# Patient Record
Sex: FEMALE | Race: BLACK OR AFRICAN AMERICAN | NOT HISPANIC OR LATINO | URBAN - METROPOLITAN AREA
[De-identification: names, ages, dates, MRNs, and addresses within clinical notes are randomized per-mention and may not be internally consistent; named-entity substitution may affect disease eponyms.]

---

## 2021-09-27 ENCOUNTER — OFFICE VISIT (OUTPATIENT)
Dept: URGENT CARE | Facility: CLINIC | Age: 21
End: 2021-09-27
Payer: COMMERCIAL

## 2021-09-27 VITALS
RESPIRATION RATE: 16 BRPM | HEART RATE: 64 BPM | DIASTOLIC BLOOD PRESSURE: 57 MMHG | TEMPERATURE: 98 F | WEIGHT: 178 LBS | SYSTOLIC BLOOD PRESSURE: 114 MMHG | BODY MASS INDEX: 34.95 KG/M2 | OXYGEN SATURATION: 99 % | HEIGHT: 60 IN

## 2021-09-27 DIAGNOSIS — Z11.3 SCREEN FOR STD (SEXUALLY TRANSMITTED DISEASE): Primary | ICD-10-CM

## 2021-09-27 LAB
B-HCG UR QL: NEGATIVE
BILIRUB UR QL STRIP: NEGATIVE
CTP QC/QA: YES
GLUCOSE UR QL STRIP: NEGATIVE
KETONES UR QL STRIP: NEGATIVE
LEUKOCYTE ESTERASE UR QL STRIP: NEGATIVE
PH, POC UA: 6 (ref 5–8)
POC BLOOD, URINE: NEGATIVE
POC NITRATES, URINE: NEGATIVE
PROT UR QL STRIP: NEGATIVE
SP GR UR STRIP: 1.02 (ref 1–1.03)
UROBILINOGEN UR STRIP-ACNC: NORMAL (ref 0.1–1.1)

## 2021-09-27 PROCEDURE — 3008F PR BODY MASS INDEX (BMI) DOCUMENTED: ICD-10-PCS | Mod: CPTII,S$GLB,, | Performed by: PHYSICIAN ASSISTANT

## 2021-09-27 PROCEDURE — 1159F MED LIST DOCD IN RCRD: CPT | Mod: CPTII,S$GLB,, | Performed by: PHYSICIAN ASSISTANT

## 2021-09-27 PROCEDURE — 81025 URINE PREGNANCY TEST: CPT | Mod: S$GLB,,, | Performed by: PHYSICIAN ASSISTANT

## 2021-09-27 PROCEDURE — 1160F RVW MEDS BY RX/DR IN RCRD: CPT | Mod: CPTII,S$GLB,, | Performed by: PHYSICIAN ASSISTANT

## 2021-09-27 PROCEDURE — 3008F BODY MASS INDEX DOCD: CPT | Mod: CPTII,S$GLB,, | Performed by: PHYSICIAN ASSISTANT

## 2021-09-27 PROCEDURE — 87481 CANDIDA DNA AMP PROBE: CPT | Mod: 59 | Performed by: PHYSICIAN ASSISTANT

## 2021-09-27 PROCEDURE — 86592 SYPHILIS TEST NON-TREP QUAL: CPT | Performed by: PHYSICIAN ASSISTANT

## 2021-09-27 PROCEDURE — 81025 POCT URINE PREGNANCY: ICD-10-PCS | Mod: S$GLB,,, | Performed by: PHYSICIAN ASSISTANT

## 2021-09-27 PROCEDURE — 3074F SYST BP LT 130 MM HG: CPT | Mod: CPTII,S$GLB,, | Performed by: PHYSICIAN ASSISTANT

## 2021-09-27 PROCEDURE — 1159F PR MEDICATION LIST DOCUMENTED IN MEDICAL RECORD: ICD-10-PCS | Mod: CPTII,S$GLB,, | Performed by: PHYSICIAN ASSISTANT

## 2021-09-27 PROCEDURE — 99214 OFFICE O/P EST MOD 30 MIN: CPT | Mod: 25,S$GLB,, | Performed by: PHYSICIAN ASSISTANT

## 2021-09-27 PROCEDURE — 1160F PR REVIEW ALL MEDS BY PRESCRIBER/CLIN PHARMACIST DOCUMENTED: ICD-10-PCS | Mod: CPTII,S$GLB,, | Performed by: PHYSICIAN ASSISTANT

## 2021-09-27 PROCEDURE — 3078F PR MOST RECENT DIASTOLIC BLOOD PRESSURE < 80 MM HG: ICD-10-PCS | Mod: CPTII,S$GLB,, | Performed by: PHYSICIAN ASSISTANT

## 2021-09-27 PROCEDURE — 87491 CHLMYD TRACH DNA AMP PROBE: CPT | Mod: 59 | Performed by: PHYSICIAN ASSISTANT

## 2021-09-27 PROCEDURE — 87389 HIV-1 AG W/HIV-1&-2 AB AG IA: CPT | Performed by: PHYSICIAN ASSISTANT

## 2021-09-27 PROCEDURE — 99214 PR OFFICE/OUTPT VISIT, EST, LEVL IV, 30-39 MIN: ICD-10-PCS | Mod: 25,S$GLB,, | Performed by: PHYSICIAN ASSISTANT

## 2021-09-27 PROCEDURE — 86803 HEPATITIS C AB TEST: CPT | Performed by: PHYSICIAN ASSISTANT

## 2021-09-27 PROCEDURE — 3074F PR MOST RECENT SYSTOLIC BLOOD PRESSURE < 130 MM HG: ICD-10-PCS | Mod: CPTII,S$GLB,, | Performed by: PHYSICIAN ASSISTANT

## 2021-09-27 PROCEDURE — 81003 POCT URINALYSIS, DIPSTICK, AUTOMATED, W/O SCOPE: ICD-10-PCS | Mod: QW,S$GLB,, | Performed by: PHYSICIAN ASSISTANT

## 2021-09-27 PROCEDURE — 81003 URINALYSIS AUTO W/O SCOPE: CPT | Mod: QW,S$GLB,, | Performed by: PHYSICIAN ASSISTANT

## 2021-09-27 PROCEDURE — 87591 N.GONORRHOEAE DNA AMP PROB: CPT | Mod: 59 | Performed by: PHYSICIAN ASSISTANT

## 2021-09-27 PROCEDURE — 3078F DIAST BP <80 MM HG: CPT | Mod: CPTII,S$GLB,, | Performed by: PHYSICIAN ASSISTANT

## 2021-09-27 RX ORDER — BUDESONIDE AND FORMOTEROL FUMARATE DIHYDRATE 160; 4.5 UG/1; UG/1
AEROSOL RESPIRATORY (INHALATION)
COMMUNITY
Start: 2021-08-06 | End: 2023-07-06

## 2021-09-29 LAB
C TRACH DNA SPEC QL NAA+PROBE: NOT DETECTED
HCV AB SERPL QL IA: NEGATIVE
HIV 1+2 AB+HIV1 P24 AG SERPL QL IA: NEGATIVE
N GONORRHOEA DNA SPEC QL NAA+PROBE: NOT DETECTED
RPR SER QL: NORMAL

## 2021-10-04 LAB
BACTERIAL VAGINOSIS DNA: POSITIVE
CANDIDA GLABRATA DNA: NEGATIVE
CANDIDA KRUSEI DNA: NEGATIVE
CANDIDA RRNA VAG QL PROBE: NEGATIVE
T VAGINALIS RRNA GENITAL QL PROBE: NEGATIVE

## 2021-10-05 ENCOUNTER — TELEPHONE (OUTPATIENT)
Dept: URGENT CARE | Facility: CLINIC | Age: 21
End: 2021-10-05

## 2021-10-05 DIAGNOSIS — B96.89 BV (BACTERIAL VAGINOSIS): Primary | ICD-10-CM

## 2021-10-05 DIAGNOSIS — N76.0 BV (BACTERIAL VAGINOSIS): Primary | ICD-10-CM

## 2021-10-05 RX ORDER — METRONIDAZOLE 500 MG/1
500 TABLET ORAL EVERY 12 HOURS
Qty: 14 TABLET | Refills: 0 | Status: SHIPPED | OUTPATIENT
Start: 2021-10-05 | End: 2021-10-12

## 2022-09-24 ENCOUNTER — HOSPITAL ENCOUNTER (EMERGENCY)
Facility: OTHER | Age: 22
Discharge: HOME | End: 2022-09-24
Attending: EMERGENCY MEDICINE
Payer: COMMERCIAL

## 2022-09-24 VITALS
BODY MASS INDEX: 39.27 KG/M2 | OXYGEN SATURATION: 100 % | RESPIRATION RATE: 22 BRPM | HEIGHT: 60 IN | DIASTOLIC BLOOD PRESSURE: 75 MMHG | WEIGHT: 200 LBS | SYSTOLIC BLOOD PRESSURE: 125 MMHG | HEART RATE: 60 BPM | TEMPERATURE: 98 F

## 2022-09-24 DIAGNOSIS — F41.9 ANXIETY: ICD-10-CM

## 2022-09-24 DIAGNOSIS — R11.2 NAUSEA AND VOMITING, INTRACTABILITY OF VOMITING NOT SPECIFIED, UNSPECIFIED VOMITING TYPE: Primary | ICD-10-CM

## 2022-09-24 DIAGNOSIS — R00.1 BRADYCARDIA: ICD-10-CM

## 2022-09-24 LAB
ALBUMIN SERPL BCP-MCNC: 3.6 G/DL (ref 3.5–5.2)
ALP SERPL-CCNC: 56 U/L (ref 55–135)
ALT SERPL W/O P-5'-P-CCNC: 30 U/L (ref 10–44)
ANION GAP SERPL CALC-SCNC: 10 MMOL/L (ref 8–16)
AST SERPL-CCNC: 19 U/L (ref 10–40)
B-HCG UR QL: NEGATIVE
BASOPHILS # BLD AUTO: 0.02 K/UL (ref 0–0.2)
BASOPHILS NFR BLD: 0.3 % (ref 0–1.9)
BILIRUB SERPL-MCNC: 0.3 MG/DL (ref 0.1–1)
BUN SERPL-MCNC: 9 MG/DL (ref 6–20)
CALCIUM SERPL-MCNC: 9.4 MG/DL (ref 8.7–10.5)
CHLORIDE SERPL-SCNC: 108 MMOL/L (ref 95–110)
CO2 SERPL-SCNC: 21 MMOL/L (ref 23–29)
CREAT SERPL-MCNC: 0.6 MG/DL (ref 0.5–1.4)
CTP QC/QA: YES
DIFFERENTIAL METHOD: ABNORMAL
EOSINOPHIL # BLD AUTO: 0.2 K/UL (ref 0–0.5)
EOSINOPHIL NFR BLD: 3.2 % (ref 0–8)
ERYTHROCYTE [DISTWIDTH] IN BLOOD BY AUTOMATED COUNT: 15.2 % (ref 11.5–14.5)
EST. GFR  (NO RACE VARIABLE): >60 ML/MIN/1.73 M^2
GLUCOSE SERPL-MCNC: 81 MG/DL (ref 70–110)
HCT VFR BLD AUTO: 35.9 % (ref 37–48.5)
HCV AB SERPL QL IA: NEGATIVE
HGB BLD-MCNC: 11.1 G/DL (ref 12–16)
HIV 1+2 AB+HIV1 P24 AG SERPL QL IA: NEGATIVE
IMM GRANULOCYTES # BLD AUTO: 0.01 K/UL (ref 0–0.04)
IMM GRANULOCYTES NFR BLD AUTO: 0.1 % (ref 0–0.5)
LYMPHOCYTES # BLD AUTO: 3.9 K/UL (ref 1–4.8)
LYMPHOCYTES NFR BLD: 54 % (ref 18–48)
MAGNESIUM SERPL-MCNC: 1.9 MG/DL (ref 1.6–2.6)
MCH RBC QN AUTO: 23.3 PG (ref 27–31)
MCHC RBC AUTO-ENTMCNC: 30.9 G/DL (ref 32–36)
MCV RBC AUTO: 75 FL (ref 82–98)
MONOCYTES # BLD AUTO: 0.9 K/UL (ref 0.3–1)
MONOCYTES NFR BLD: 11.9 % (ref 4–15)
NEUTROPHILS # BLD AUTO: 2.2 K/UL (ref 1.8–7.7)
NEUTROPHILS NFR BLD: 30.5 % (ref 38–73)
NRBC BLD-RTO: 0 /100 WBC
PLATELET # BLD AUTO: 326 K/UL (ref 150–450)
PMV BLD AUTO: 11.3 FL (ref 9.2–12.9)
POCT GLUCOSE: 88 MG/DL (ref 70–110)
POTASSIUM SERPL-SCNC: 4.3 MMOL/L (ref 3.5–5.1)
PROT SERPL-MCNC: 7.7 G/DL (ref 6–8.4)
RBC # BLD AUTO: 4.77 M/UL (ref 4–5.4)
SODIUM SERPL-SCNC: 139 MMOL/L (ref 136–145)
TSH SERPL DL<=0.005 MIU/L-ACNC: 2.69 UIU/ML (ref 0.4–4)
WBC # BLD AUTO: 7.2 K/UL (ref 3.9–12.7)

## 2022-09-24 PROCEDURE — 84443 ASSAY THYROID STIM HORMONE: CPT | Performed by: EMERGENCY MEDICINE

## 2022-09-24 PROCEDURE — 25000003 PHARM REV CODE 250: Performed by: EMERGENCY MEDICINE

## 2022-09-24 PROCEDURE — 93010 EKG 12-LEAD: ICD-10-PCS | Mod: ,,, | Performed by: INTERNAL MEDICINE

## 2022-09-24 PROCEDURE — 93010 ELECTROCARDIOGRAM REPORT: CPT | Mod: ,,, | Performed by: INTERNAL MEDICINE

## 2022-09-24 PROCEDURE — 81025 URINE PREGNANCY TEST: CPT | Performed by: EMERGENCY MEDICINE

## 2022-09-24 PROCEDURE — 87389 HIV-1 AG W/HIV-1&-2 AB AG IA: CPT | Performed by: EMERGENCY MEDICINE

## 2022-09-24 PROCEDURE — 85025 COMPLETE CBC W/AUTO DIFF WBC: CPT | Performed by: EMERGENCY MEDICINE

## 2022-09-24 PROCEDURE — 93005 ELECTROCARDIOGRAM TRACING: CPT

## 2022-09-24 PROCEDURE — 83735 ASSAY OF MAGNESIUM: CPT | Performed by: EMERGENCY MEDICINE

## 2022-09-24 PROCEDURE — 82962 GLUCOSE BLOOD TEST: CPT

## 2022-09-24 PROCEDURE — 99284 EMERGENCY DEPT VISIT MOD MDM: CPT | Mod: 25

## 2022-09-24 PROCEDURE — 80053 COMPREHEN METABOLIC PANEL: CPT | Performed by: EMERGENCY MEDICINE

## 2022-09-24 PROCEDURE — 86803 HEPATITIS C AB TEST: CPT | Performed by: EMERGENCY MEDICINE

## 2022-09-24 RX ORDER — ONDANSETRON 4 MG/1
4 TABLET, ORALLY DISINTEGRATING ORAL EVERY 6 HOURS PRN
Qty: 20 TABLET | Refills: 0 | Status: SHIPPED | OUTPATIENT
Start: 2022-09-24

## 2022-09-24 RX ORDER — NAPROXEN 500 MG/1
500 TABLET ORAL 2 TIMES DAILY WITH MEALS
Qty: 20 TABLET | Refills: 0 | Status: SHIPPED | OUTPATIENT
Start: 2022-09-24

## 2022-09-24 RX ORDER — NAPROXEN 250 MG/1
250 TABLET ORAL
Status: COMPLETED | OUTPATIENT
Start: 2022-09-24 | End: 2022-09-24

## 2022-09-24 RX ORDER — HYDROXYZINE PAMOATE 25 MG/1
25 CAPSULE ORAL EVERY 6 HOURS PRN
Qty: 25 CAPSULE | Refills: 0 | Status: SHIPPED | OUTPATIENT
Start: 2022-09-24

## 2022-09-24 RX ADMIN — NAPROXEN 250 MG: 250 TABLET ORAL at 06:09

## 2022-09-24 NOTE — ED PROVIDER NOTES
"Encounter Date: 2022    SCRIBE #1 NOTE: I, Charito Owens, am scribing for, and in the presence of,  Dave Curiel MD. I have scribed the following portions of the note - Other sections scribed: HPI, ROS, EKG.     History     Chief Complaint   Patient presents with    Nausea     Pt advised over the past 2-3 days she has been having sever nausea - pt denies vomiting, pt is unable to sleep due to the nausea - pt is also having llq pain/cramping     Dhara Cortes is a 22 y.o. female, with a PMHx of asthma, who presents to the ED for evaluation of 7/10 intensity nausea with vomiting ongoing for the last 4 days. She states she has difficulty with P/O intake and self hydration secondary to her nausea, stating "I think I was dehydrated because I wasn't leaving my room and I didn't pee for a whole day." She endorses associated symptoms of headache, chills, stating "the smell of cold air is what makes me nauseated," left upper and lower quadrant abdominal pain, and back pain. She reports noticing hematuria 4 days ago but subsequently got her period 2 days later, which she notes is 15 days early. She attributes her symptoms to increased anxiety and stress recently, "I think I just shut down." She states she has had increased familial stress with weekly panic attacks after her cousin  on 23. She denies vaginal discharge, dysuria, pain elsewhere, and other somatic complaints. She has NKDA. This is the extent of the patient's complaints at this time.    The history is provided by the patient.   Review of patient's allergies indicates:  No Known Allergies  Past Medical History:   Diagnosis Date    Asthma      History reviewed. No pertinent surgical history.  Family History   Problem Relation Age of Onset    No Known Problems Mother     No Known Problems Father      Social History     Tobacco Use    Smoking status: Never    Smokeless tobacco: Never   Substance Use Topics    Alcohol use: Not Currently    Drug use: Never "     Review of Systems   Constitutional:  Positive for chills. Negative for fever.   HENT:  Negative for rhinorrhea.    Respiratory:  Negative for cough, chest tightness, shortness of breath and wheezing.    Cardiovascular:  Negative for chest pain and leg swelling.   Gastrointestinal:  Positive for abdominal pain (left), nausea and vomiting. Negative for diarrhea.   Genitourinary:  Positive for hematuria.   Musculoskeletal:  Positive for back pain. Negative for myalgias.   Allergic/Immunologic: Negative for food allergies.   Neurological:  Positive for headaches. Negative for speech difficulty, weakness and light-headedness.   Psychiatric/Behavioral:  The patient is nervous/anxious.    All other systems reviewed and are negative.    Physical Exam     Initial Vitals [09/24/22 0200]   BP Pulse Resp Temp SpO2   (!) 172/81 77 16 98.5 °F (36.9 °C) 99 %      MAP       --         Physical Exam    Nursing note and vitals reviewed.  Constitutional: She appears well-developed and well-nourished. She is not diaphoretic. No distress.   HENT:   Head: Normocephalic and atraumatic.   Right Ear: External ear normal.   Left Ear: External ear normal.   Eyes: Conjunctivae and EOM are normal. Pupils are equal, round, and reactive to light.   Neck: Neck supple. No tracheal deviation present. No JVD present.   Normal range of motion.  Cardiovascular:  Normal rate, regular rhythm, normal heart sounds and intact distal pulses.     Exam reveals no gallop and no friction rub.       No murmur heard.  Pulmonary/Chest: Breath sounds normal. No respiratory distress. She has no wheezes. She has no rhonchi. She has no rales. She exhibits no tenderness.   Abdominal: Abdomen is soft. Bowel sounds are normal. She exhibits no distension and no mass. There is no abdominal tenderness. There is no rebound and no guarding.   Musculoskeletal:         General: No tenderness or edema. Normal range of motion.      Cervical back: Normal range of motion and  neck supple.     Neurological: She is alert and oriented to person, place, and time. She has normal strength. She displays normal reflexes. No cranial nerve deficit or sensory deficit. GCS score is 15. GCS eye subscore is 4. GCS verbal subscore is 5. GCS motor subscore is 6.   Skin: Skin is warm and dry. Capillary refill takes less than 2 seconds. No rash noted. No erythema.   Psychiatric:   Anxious, cooperative       ED Course   Procedures  Labs Reviewed   CBC W/ AUTO DIFFERENTIAL - Abnormal; Notable for the following components:       Result Value    Hemoglobin 11.1 (*)     Hematocrit 35.9 (*)     MCV 75 (*)     MCH 23.3 (*)     MCHC 30.9 (*)     RDW 15.2 (*)     Gran % 30.5 (*)     Lymph % 54.0 (*)     All other components within normal limits   COMPREHENSIVE METABOLIC PANEL - Abnormal; Notable for the following components:    CO2 21 (*)     All other components within normal limits   HIV 1 / 2 ANTIBODY    Narrative:     Release to patient->Immediate   HEPATITIS C ANTIBODY    Narrative:     Release to patient->Immediate   MAGNESIUM   TSH   POCT URINE PREGNANCY   POCT GLUCOSE   POCT GLUCOSE MONITORING CONTINUOUS     EKG Readings: (Independently Interpreted)   Sinus bradycardia. Rate of 56 bpm. No STEMI. Normal Q.T interval.       Imaging Results    None          Medications   naproxen tablet 250 mg (has no administration in time range)     Medical Decision Making:   History:   Old Medical Records: I decided to obtain old medical records.  Differential Diagnosis:   Anxiety, panic attack, thyroid disorder, electrolyte abnormality, early pregnancy, hypoglycemia, gastroesophageal reflux disease, peptic ulcer disease  Independently Interpreted Test(s):   I have ordered and independently interpreted EKG Reading(s) - see prior notes  Clinical Tests:   Lab Tests: Ordered and Reviewed  Medical Tests: Ordered and Reviewed  ED Management:  Discussed with patient that there was no significant findings on today's evaluation,  but I did recommend that she find a PCP locally to help her continue to try and help figure out the cause of her symptoms.  Did point out to the patient that I feel is a strong anxiety component related to her symptoms, so will provide her with medications including nausea medicine until she can follow-up.  Patient discharged home in stable condition. Diagnosis and treatment plan explained to patient. No further workup indicated based on their complaints or examination today. Discussed results with the patient. I educated the patient/guardian on the warning signs and symptoms for which they must seek immediate medical attention. All questions addressed and patient/guardian were given discharge instructions and followup information.         Scribe Attestation:   Scribe #1: I performed the above scribed service and the documentation accurately describes the services I performed. I attest to the accuracy of the note.    I, IKE, personally performed the services described in this documentation. All medical record entries made by the scribe were at my direction and in my presence. I have reviewed the chart and agree that the record reflects my personal performance and is accurate and complete.                   Clinical Impression:   Final diagnoses:  [R00.1] Bradycardia  [R11.2] Nausea and vomiting, intractability of vomiting not specified, unspecified vomiting type (Primary)  [F41.9] Anxiety      ED Disposition Condition    Discharge Stable          ED Prescriptions       Medication Sig Dispense Start Date End Date Auth. Provider    ondansetron (ZOFRAN-ODT) 4 MG TbDL Take 1 tablet (4 mg total) by mouth every 6 (six) hours as needed (Nausea and vomiting). 20 tablet 9/24/2022 -- Dave Curiel MD    naproxen (NAPROSYN) 500 MG tablet Take 1 tablet (500 mg total) by mouth 2 (two) times daily with meals. For pain 20 tablet 9/24/2022 -- Dave Curiel MD    hydrOXYzine pamoate (VISTARIL) 25 MG Cap Take 1 capsule (25 mg  total) by mouth every 6 (six) hours as needed (Anxiety). 25 capsule 9/24/2022 -- Dave Curiel MD          Follow-up Information       Follow up With Specialties Details Why Contact Info Additional Information    [facility] - Internal Medicine Internal Medicine Schedule an appointment as soon as possible for a visit  For follow-up and re-evaluation [address] [facility] [id number]  [phone] Internal Medicine - [facility], 8th Floor, [address] Please park in [place] and use [place] elevators    [facility] - Emergency Dept Emergency Medicine  As needed, for any new or worsening symptoms [address] [facility] [id number] [phone]              Dave Curiel MD  09/24/22 0549

## 2022-09-24 NOTE — ED TRIAGE NOTES
"Pt c/o nausea w/ vomiting for approx 1 week, states she "went without eating/drinking for about 2 days and felt she was dehydrated." When she started eating again the nausea, started. Also c/o LUQ abd pain. Started menses 3 days ago.    LOC: The patient is awake, alert, and oriented to self, place, time, and situation. Pt is calm and cooperative. Affect is appropriate.  Speech is appropriate and clear.      APPEARANCE: Patient resting comfortably in no acute distress.  Patient is clean and well groomed.     SKIN: The skin is warm and dry; color consistent with ethnicity, intact; no breakdown or bruising noted.      MUSCULOSKELETAL:  MAEAW; denies pain or weakness.      RESPIRATORY: Airway is open and patent. Respirations are non-labored with normal effort and rate.  Denies cough.      CARDIAC:  Normal rate noted, normotensive.  No peripheral edema noted. No complaints of chest pain at this time.      ABDOMEN: Pt admits abdominal pain, nausea, vomiting, and intermittent diarrhea.     NEUROLOGIC: Follows commands;  Pt denies headache, lightheadedness or dizziness; denies loss of consciousness.       "

## 2023-07-06 ENCOUNTER — OFFICE VISIT (OUTPATIENT)
Dept: OTOLARYNGOLOGY | Facility: CLINIC | Age: 23
End: 2023-07-06
Payer: COMMERCIAL

## 2023-07-06 VITALS — WEIGHT: 222.25 LBS | BODY MASS INDEX: 43.4 KG/M2

## 2023-07-06 DIAGNOSIS — J45.20 INTERMITTENT ASTHMA WITHOUT COMPLICATION, UNSPECIFIED ASTHMA SEVERITY: ICD-10-CM

## 2023-07-06 DIAGNOSIS — J34.3 HYPERTROPHY OF INFERIOR NASAL TURBINATE: ICD-10-CM

## 2023-07-06 DIAGNOSIS — J01.90 ACUTE NON-RECURRENT SINUSITIS, UNSPECIFIED LOCATION: ICD-10-CM

## 2023-07-06 DIAGNOSIS — J30.89 NON-SEASONAL ALLERGIC RHINITIS DUE TO OTHER ALLERGIC TRIGGER: ICD-10-CM

## 2023-07-06 DIAGNOSIS — R07.0 BURNING SENSATION OF THROAT: ICD-10-CM

## 2023-07-06 DIAGNOSIS — J06.9 VIRAL URI: Primary | ICD-10-CM

## 2023-07-06 DIAGNOSIS — R04.1 HEMORRHAGE OF VOCAL CORD: ICD-10-CM

## 2023-07-06 PROBLEM — Z91.09 HOUSE DUST MITE ALLERGY: Status: ACTIVE | Noted: 2023-07-06

## 2023-07-06 PROCEDURE — 99999 PR PBB SHADOW E&M-EST. PATIENT-LVL III: ICD-10-PCS | Mod: PBBFAC,,, | Performed by: PHYSICIAN ASSISTANT

## 2023-07-06 PROCEDURE — 31575 DIAGNOSTIC LARYNGOSCOPY: CPT | Mod: S$GLB,,, | Performed by: PHYSICIAN ASSISTANT

## 2023-07-06 PROCEDURE — 3008F BODY MASS INDEX DOCD: CPT | Mod: CPTII,S$GLB,, | Performed by: PHYSICIAN ASSISTANT

## 2023-07-06 PROCEDURE — 99999 PR PBB SHADOW E&M-EST. PATIENT-LVL III: CPT | Mod: PBBFAC,,, | Performed by: PHYSICIAN ASSISTANT

## 2023-07-06 PROCEDURE — 31575 LARYNGOSCOPY: ICD-10-PCS | Mod: S$GLB,,, | Performed by: PHYSICIAN ASSISTANT

## 2023-07-06 PROCEDURE — 1159F PR MEDICATION LIST DOCUMENTED IN MEDICAL RECORD: ICD-10-PCS | Mod: CPTII,S$GLB,, | Performed by: PHYSICIAN ASSISTANT

## 2023-07-06 PROCEDURE — 1159F MED LIST DOCD IN RCRD: CPT | Mod: CPTII,S$GLB,, | Performed by: PHYSICIAN ASSISTANT

## 2023-07-06 PROCEDURE — 99204 OFFICE O/P NEW MOD 45 MIN: CPT | Mod: 25,S$GLB,, | Performed by: PHYSICIAN ASSISTANT

## 2023-07-06 PROCEDURE — 99204 PR OFFICE/OUTPT VISIT, NEW, LEVL IV, 45-59 MIN: ICD-10-PCS | Mod: 25,S$GLB,, | Performed by: PHYSICIAN ASSISTANT

## 2023-07-06 PROCEDURE — 3008F PR BODY MASS INDEX (BMI) DOCUMENTED: ICD-10-PCS | Mod: CPTII,S$GLB,, | Performed by: PHYSICIAN ASSISTANT

## 2023-07-06 RX ORDER — ALBUTEROL SULFATE 90 UG/1
2 AEROSOL, METERED RESPIRATORY (INHALATION) EVERY 6 HOURS PRN
Qty: 8 G | Refills: 0 | Status: SHIPPED | OUTPATIENT
Start: 2023-07-06

## 2023-07-06 RX ORDER — AZELASTINE 1 MG/ML
1 SPRAY, METERED NASAL 2 TIMES DAILY
Qty: 15 ML | Refills: 2 | Status: SHIPPED | OUTPATIENT
Start: 2023-07-06 | End: 2023-12-17

## 2023-07-06 RX ORDER — BUDESONIDE AND FORMOTEROL FUMARATE DIHYDRATE 160; 4.5 UG/1; UG/1
2 AEROSOL RESPIRATORY (INHALATION) EVERY 12 HOURS
Qty: 10.2 G | Refills: 0 | Status: SHIPPED | OUTPATIENT
Start: 2023-07-06 | End: 2024-07-05

## 2023-07-06 RX ORDER — AMOXICILLIN 875 MG/1
875 TABLET, FILM COATED ORAL EVERY 12 HOURS
Qty: 20 TABLET | Refills: 0 | Status: SHIPPED | OUTPATIENT
Start: 2023-07-06 | End: 2023-07-16

## 2023-07-06 NOTE — PATIENT INSTRUCTIONS
Astelin (Azelastine)  This is a nasal spray that primarily treats nasal drainage/runny nose (rhinorrhea) and nasal itching.    This works fairly quickly after onset and can be used as needed (does not have to be used as a scheduled medication).  Use as directed, up to 2 times daily.    Helpful hints for maximizing nasal spray medication into the nose  - Use the opposite hand to spray the nostril (example: right hand for left nostril). This will help avoid spraying the medication onto the septum (the area that divides the left and right nasal cavity.)  - Tilt the bottle so that it is facing at a slight angle up or straight back, but avoid pointing the bottle straight up while spraying.   - Gently sniff (do not snort) a few seconds after spraying.

## 2023-07-06 NOTE — PROGRESS NOTES
Subjective:     HPI: Dhara Cortes is a 23 y.o. female who was self-referred for  throat burning .    Patient reports developing sore throat 5 days ago with associated fevers (101), chills, neck pain, and odynophagia. She took cold medicine and next day bilateral ears were burning.  Patient reports persistent odynophagia including water and food causing a burning sensation in her throat.  Patient was told that she had white spots on her tonsils and was advised to go to the ED. Patient was seen at a clinic and had negative strep and COVID testing.  Patient was started on azithromycin and prednisone but patient was unable to tolerate azithromycin GI side effects.  She is been taking ibuprofen which has seemed to help with the ear burning.  Patient denies any muffled hearing.    Current sinonasal medications include none at present.  The last course of antibiotics was recently.    She does not recall previously having allergy testing.  She relates a history of asthma which is currently managed with symbicort and albuterol.  She denies a history of reflux symptoms.    She denies a diagnosis of obstructive sleep apnea.   She does not recall a prior history of nasal trauma.  She has not had sinonasal surgery.    She has not had a tonsillectomy.  She is not a tobacco smoker.     Past Medical/Past Surgical History  Past Medical History:   Diagnosis Date    Asthma      She has no past surgical history on file.    Family History/Social History  Her family history includes No Known Problems in her father and mother.  She reports that she has never smoked. She has never used smokeless tobacco. She reports that she does not currently use alcohol. She reports that she does not use drugs.    Allergies/Immunizations  She has No Known Allergies.    There is no immunization history on file for this patient.     Medications   budesonide-formoterol 160-4.5 mcg Hfaa  hydrOXYzine pamoate Cap  naproxen  ondansetron Tbdl     Review of  Systems     Constitutional: Positive for chills and fever.      HENT: Positive for sinus pressure, sore throat, stuffy nose, tonsil infection, trouble swallowing and voice change.  Negative for ear discharge, ear pain, hearing loss, nosebleeds, postnasal drip and runny nose.      Respiratory:  Positive for cough. Negative for snoring.      Allergy: Negative for seasonal allergies.     Neurological: Negative for dizziness and headaches.      Hematologic: Negative for swollen glands.      Psychiatric: Negative for sleep disturbance.          Objective:     Wt 100.8 kg (222 lb 3.6 oz)   BMI 43.40 kg/m²        Constitutional:   She appears well-developed and well-nourished. Normal speech.      Head:  Normocephalic and atraumatic. Facial strength is normal.      Ears:    Right Ear: No drainage or swelling. Tympanic membrane is not perforated and not erythematous. No middle ear effusion.   Left Ear: No drainage or swelling. Tympanic membrane is not perforated and not erythematous.  No middle ear effusion.     Nose:  No mucosal edema, rhinorrhea, septal deviation or polyps.  No foreign bodies. Turbinate hypertrophy (cyanotic).  Turbinates normal and no turbinate masses.  Right sinus exhibits no maxillary sinus tenderness and no frontal sinus tenderness. Left sinus exhibits no maxillary sinus tenderness and no frontal sinus tenderness.   Possible coagulated blood near R MT    Mouth/Throat  Oropharynx clear and moist without lesions or asymmetry, normal uvula midline and lips, teeth, and gums normal. No trismus or mucous membrane lesions. No oropharyngeal exudate, posterior oropharyngeal edema, posterior oropharyngeal erythema or tonsillar abscesses. Tonsils present, +3, tonsillar erythema.  Tonsillar exudate.    Ulceration on L tonsil; no exudates      Neck:  Neck normal without thyromegaly masses, asymmetry, normal tracheal structure, crepitus, and tenderness and phonation normal.     She has no cervical adenopathy.   TTP  to anterior, paratracheal neck region     Pulmonary/Chest:   Effort normal.     Psychiatric:   She has a normal mood and affect. Her speech is normal and behavior is normal.     Procedure    Flexible laryngoscopy performed.  See procedure note.     L NV     L MT     L ET with white purulence on nasopharyngeal wall      R NV      R MT with clear mucous    Hypopharynx/larynx with postcricoid edema and suspected L VF hemorrhage     VF mobility      Data Reviewed  I personally reviewed the chart, including any outside records, and pertinent data below:    WBC (K/uL)   Date Value   09/24/2022 7.20     Eosinophil % (%)   Date Value   09/24/2022 3.2     Eos # (K/uL)   Date Value   09/24/2022 0.2     Platelets (K/uL)   Date Value   09/24/2022 326     Glucose (mg/dL)   Date Value   09/24/2022 81     No results found for: IGE    No sinus imaging available.    Assessment & Plan:     1. Viral URI  2. Burning sensation of throat  -     Laryngoscopy with evidence of acute sinus infection and LPR  - No evidence of active bacterial tonsillitis, but healing ulcer on L tonsil without circumferential erythema and erythematous tonsils  - Suspect viral infection causing burning sensation to bilateral ears and throat, but could be atypical presentation for LPR  - Advised salt water gargles  - Should improve in 1-2 weeks  - (Magic mouthwash) 1:1:1 diphenhydrAMINE(Benadryl) 12.5mg/5ml liq, aluminum & magnesium hydroxide-simethicone (Maalox), LIDOcaine viscous 2%; Swish and spit 10 mLs every 4 (four) hours as needed (throat burning). for mouth sores  Dispense: 180 mL; Refill: 0    3. Acute non-recurrent sinusitis, unspecified location  -     evidence of residual sinus infection on exam; patient did not finish Zpak d/t GI side effects  - amoxicillin (AMOXIL) 875 MG tablet; Take 1 tablet (875 mg total) by mouth every 12 (twelve) hours. for 10 days  Dispense: 20 tablet; Refill: 0    4. Non-seasonal allergic rhinitis due to other allergic  trigger  5. Hypertrophy of inferior nasal turbinate  -     azelastine (ASTELIN) 137 mcg (0.1 %) nasal spray; 1 spray (137 mcg total) by Nasal route 2 (two) times daily.  Dispense: 15 mL; Refill: 2    6. Hemorrhage of vocal cord   - acute cough today likely contributing   - will re-assess voice at next visit  7. Intermittent asthma without complication, unspecified asthma severity  -     budesonide-formoterol 160-4.5 mcg (SYMBICORT) 160-4.5 mcg/actuation HFAA; Inhale 2 puffs into the lungs every 12 (twelve) hours. Controller  Dispense: 10.2 g; Refill: 0  -     albuterol (VENTOLIN HFA) 90 mcg/actuation inhaler; Inhale 2 puffs into the lungs every 6 (six) hours as needed for Wheezing. Rescue  Dispense: 8 g; Refill: 0    She will Follow up in about 3 weeks (around 7/27/2023) for re-evaluate post treatment.  I had a discussion with the patient regarding her condition and the further workup and management options.    All questions were answered, and the patient is in agreement with the above.     Disclaimer:  This note may have been prepared utilizing voice recognition software which may result in occasional typographical errors in the text such as sound alike words.   If further clarification is needed, please contact the ENT department of [facility].   (0) independent

## 2023-07-06 NOTE — PROCEDURES
"Laryngoscopy    Date/Time: 7/6/2023 2:30 PM  Performed by: Raul Harrison PA-C  Authorized by: Raul Harrison PA-C     Time out: Immediately prior to procedure a "time out" was called to verify the correct patient, procedure, equipment, support staff and site/side marked as required.    Consent Done?:  Yes (Verbal)  Anesthesia:     Local anesthetic:  Lidocaine 4% and Srikanth-Synephrine 1/2%    Patient tolerance:  Patient tolerated the procedure well with no immediate complications  Laryngoscopy:     Areas examined:  Nasal cavities, nasopharynx, oropharynx, hypopharynx, larynx and vocal cords    Laryngoscope size:  4 mm  Nose External:      No external nasal deformity  Nose Intranasal:      Mucosa no polyps     Mucosa ulcers not present     No mucosa lesions present     No septum gross deformity     Enlarged turbinates  Nasopharynx:      No mucosa lesions     Adenoids present     Posterior choanae patent     Eustachian tube patent  Larynx/hypopharynx:      No epiglottis lesions     No epiglottis edema     No AE folds lesions     No vocal cord polyps     Equal and normal bilateral     No hypopharynx lesions     No piriform sinus pooling     No piriform sinus lesions     Post cricoid edema     No post cricoid erythema     Erythema to VF  L VF with suspected VF hemorrhage  Whitish/yellow mucopurulence in nasopharynx  "

## 2023-10-26 ENCOUNTER — HOSPITAL ENCOUNTER (EMERGENCY)
Facility: OTHER | Age: 23
Discharge: HOME | End: 2023-10-26
Attending: EMERGENCY MEDICINE
Payer: COMMERCIAL

## 2023-10-26 VITALS
DIASTOLIC BLOOD PRESSURE: 78 MMHG | TEMPERATURE: 99 F | HEIGHT: 60 IN | BODY MASS INDEX: 38.28 KG/M2 | RESPIRATION RATE: 16 BRPM | OXYGEN SATURATION: 98 % | HEART RATE: 81 BPM | WEIGHT: 195 LBS | SYSTOLIC BLOOD PRESSURE: 140 MMHG

## 2023-10-26 DIAGNOSIS — R05.9 COUGH: Primary | ICD-10-CM

## 2023-10-26 DIAGNOSIS — R07.89 CHEST WALL PAIN: ICD-10-CM

## 2023-10-26 LAB
ALBUMIN SERPL BCP-MCNC: 3.9 G/DL (ref 3.5–5.2)
ALP SERPL-CCNC: 63 U/L (ref 55–135)
ALT SERPL W/O P-5'-P-CCNC: 18 U/L (ref 10–44)
ANION GAP SERPL CALC-SCNC: 10 MMOL/L (ref 8–16)
AST SERPL-CCNC: 31 U/L (ref 10–40)
BASOPHILS # BLD AUTO: 0.02 K/UL (ref 0–0.2)
BASOPHILS NFR BLD: 0.3 % (ref 0–1.9)
BILIRUB SERPL-MCNC: 0.3 MG/DL (ref 0.1–1)
BNP SERPL-MCNC: <10 PG/ML (ref 0–99)
BUN SERPL-MCNC: 13 MG/DL (ref 6–20)
CALCIUM SERPL-MCNC: 9 MG/DL (ref 8.7–10.5)
CHLORIDE SERPL-SCNC: 107 MMOL/L (ref 95–110)
CO2 SERPL-SCNC: 19 MMOL/L (ref 23–29)
CREAT SERPL-MCNC: 0.7 MG/DL (ref 0.5–1.4)
DIFFERENTIAL METHOD: ABNORMAL
EOSINOPHIL # BLD AUTO: 0.4 K/UL (ref 0–0.5)
EOSINOPHIL NFR BLD: 6.2 % (ref 0–8)
ERYTHROCYTE [DISTWIDTH] IN BLOOD BY AUTOMATED COUNT: 15.3 % (ref 11.5–14.5)
EST. GFR  (NO RACE VARIABLE): >60 ML/MIN/1.73 M^2
GLUCOSE SERPL-MCNC: 88 MG/DL (ref 70–110)
HCT VFR BLD AUTO: 35.2 % (ref 37–48.5)
HGB BLD-MCNC: 11 G/DL (ref 12–16)
IMM GRANULOCYTES # BLD AUTO: 0.02 K/UL (ref 0–0.04)
IMM GRANULOCYTES NFR BLD AUTO: 0.3 % (ref 0–0.5)
LYMPHOCYTES # BLD AUTO: 2.3 K/UL (ref 1–4.8)
LYMPHOCYTES NFR BLD: 36.4 % (ref 18–48)
MCH RBC QN AUTO: 22.9 PG (ref 27–31)
MCHC RBC AUTO-ENTMCNC: 31.3 G/DL (ref 32–36)
MCV RBC AUTO: 73 FL (ref 82–98)
MONOCYTES # BLD AUTO: 0.9 K/UL (ref 0.3–1)
MONOCYTES NFR BLD: 13.6 % (ref 4–15)
NEUTROPHILS # BLD AUTO: 2.7 K/UL (ref 1.8–7.7)
NEUTROPHILS NFR BLD: 43.2 % (ref 38–73)
NRBC BLD-RTO: 0 /100 WBC
PLATELET # BLD AUTO: 288 K/UL (ref 150–450)
PMV BLD AUTO: 10.9 FL (ref 9.2–12.9)
POTASSIUM SERPL-SCNC: 4.1 MMOL/L (ref 3.5–5.1)
PROT SERPL-MCNC: 8 G/DL (ref 6–8.4)
RBC # BLD AUTO: 4.81 M/UL (ref 4–5.4)
SODIUM SERPL-SCNC: 136 MMOL/L (ref 136–145)
TROPONIN I SERPL DL<=0.01 NG/ML-MCNC: <0.006 NG/ML (ref 0–0.03)
WBC # BLD AUTO: 6.26 K/UL (ref 3.9–12.7)

## 2023-10-26 PROCEDURE — 94640 AIRWAY INHALATION TREATMENT: CPT

## 2023-10-26 PROCEDURE — 25000003 PHARM REV CODE 250: Performed by: EMERGENCY MEDICINE

## 2023-10-26 PROCEDURE — 80053 COMPREHEN METABOLIC PANEL: CPT | Performed by: EMERGENCY MEDICINE

## 2023-10-26 PROCEDURE — 85025 COMPLETE CBC W/AUTO DIFF WBC: CPT | Performed by: EMERGENCY MEDICINE

## 2023-10-26 PROCEDURE — 99284 EMERGENCY DEPT VISIT MOD MDM: CPT | Mod: 25

## 2023-10-26 PROCEDURE — 83880 ASSAY OF NATRIURETIC PEPTIDE: CPT | Performed by: EMERGENCY MEDICINE

## 2023-10-26 PROCEDURE — 25000242 PHARM REV CODE 250 ALT 637 W/ HCPCS: Performed by: EMERGENCY MEDICINE

## 2023-10-26 PROCEDURE — 84484 ASSAY OF TROPONIN QUANT: CPT | Performed by: EMERGENCY MEDICINE

## 2023-10-26 RX ORDER — BENZONATATE 100 MG/1
100 CAPSULE ORAL 3 TIMES DAILY PRN
Qty: 20 CAPSULE | Refills: 0 | Status: SHIPPED | OUTPATIENT
Start: 2023-10-26 | End: 2023-11-05

## 2023-10-26 RX ORDER — BENZONATATE 100 MG/1
100 CAPSULE ORAL 3 TIMES DAILY PRN
Status: DISCONTINUED | OUTPATIENT
Start: 2023-10-26 | End: 2023-10-26 | Stop reason: HOSPADM

## 2023-10-26 RX ORDER — FLUTICASONE PROPIONATE 50 MCG
1 SPRAY, SUSPENSION (ML) NASAL 2 TIMES DAILY PRN
Qty: 15 G | Refills: 0 | Status: SHIPPED | OUTPATIENT
Start: 2023-10-26

## 2023-10-26 RX ORDER — ALBUTEROL SULFATE 90 UG/1
1-2 AEROSOL, METERED RESPIRATORY (INHALATION) EVERY 4 HOURS PRN
Qty: 8 G | Refills: 1 | Status: SHIPPED | OUTPATIENT
Start: 2023-10-26 | End: 2024-10-25

## 2023-10-26 RX ORDER — IBUPROFEN 600 MG/1
600 TABLET ORAL EVERY 6 HOURS PRN
Qty: 20 TABLET | Refills: 0 | Status: SHIPPED | OUTPATIENT
Start: 2023-10-26

## 2023-10-26 RX ORDER — ALBUTEROL SULFATE 2.5 MG/.5ML
2.5 SOLUTION RESPIRATORY (INHALATION)
Status: COMPLETED | OUTPATIENT
Start: 2023-10-26 | End: 2023-10-26

## 2023-10-26 RX ADMIN — ALBUTEROL SULFATE 2.5 MG: 2.5 SOLUTION RESPIRATORY (INHALATION) at 03:10

## 2023-10-26 RX ADMIN — SODIUM CHLORIDE 1000 ML: 0.9 INJECTION, SOLUTION INTRAVENOUS at 03:10

## 2023-10-26 NOTE — ED NOTES
Patient states she has been coughing X 3 weeks and that when she coughs her chest is hurting in the front and back

## 2023-10-26 NOTE — ED PROVIDER NOTES
"Encounter Date: 10/26/2023    SCRIBE #1 NOTE: I, Raul Kami, am scribing for, and in the presence of,  Radha Diaz MD.       History     Chief Complaint   Patient presents with    Pleurisy     X 4 days  pt states her chest hurts from coughing,, pt states the pain is front of chest and in her back     Cough     X 3 weeks      Time seen by provider: 2:40 AM    Dhara Cortes is a 23 y.o. female who presents to the ED with cough for the past four days. She reports having a cold three weeks ago with productive cough and runny nose. She complains that her cough transiently improved but has persisted despite improvement of other symptoms after one week. After a short period of cough improvement, she states her cough worsened as a nonproductive cough four days ago. She reports an accompanying "burning" chest pain when coughing since yesterday that she notes is in the center of her chest and back. She denies any fevers. She denies any PMHx or PSHx. She notes social alcohol use but denies cigarette or drug use. This is the extent of the patient's complaints today in the Emergency Department.      The history is provided by the patient.     Review of patient's allergies indicates:  No Known Allergies  Past Medical History:   Diagnosis Date    Asthma     House dust mite allergy 7/6/2023    Mild intermittent asthma without complication 7/6/2023     No past surgical history on file.  Family History   Problem Relation Age of Onset    No Known Problems Mother     No Known Problems Father      Social History     Tobacco Use    Smoking status: Never    Smokeless tobacco: Never   Substance Use Topics    Alcohol use: Not Currently    Drug use: Never     Review of Systems   Constitutional:  Negative for chills and fever.   HENT:  Negative for congestion and sore throat.    Eyes:  Negative for visual disturbance.   Respiratory:  Positive for cough. Negative for shortness of breath.    Cardiovascular:  Positive for chest pain. " Negative for palpitations.   Gastrointestinal:  Negative for abdominal pain, diarrhea and vomiting.   Genitourinary:  Negative for decreased urine volume, dysuria and vaginal discharge.   Musculoskeletal:  Negative for joint swelling, neck pain and neck stiffness.   Skin:  Negative for rash and wound.   Neurological:  Negative for weakness, numbness and headaches.   Psychiatric/Behavioral:  Negative for confusion.        Physical Exam     Initial Vitals [10/26/23 0121]   BP Pulse Resp Temp SpO2   (!) 143/69 75 18 98.2 °F (36.8 °C) 98 %      MAP       --         Physical Exam    Nursing note and vitals reviewed.  Constitutional: She appears well-developed and well-nourished. No distress.   HENT:   Head: Normocephalic and atraumatic.   Mouth/Throat: Oropharynx is clear and moist. No oropharyngeal exudate.   Swelling with clear nasal discharge.    There is cobblestoning of the posterior oropharynx with normal tonsils and midline uvula.    Bilateral TMs with clear effusions but no erythema or bulging.   Eyes: Conjunctivae and EOM are normal. Pupils are equal, round, and reactive to light.   Neck: Neck supple.   Normal range of motion.  Cardiovascular:  Normal rate and normal heart sounds.           No murmur heard.  Pulmonary/Chest: Breath sounds normal. No respiratory distress. She has no wheezes. She has no rhonchi. She has no rales.   Abdominal: Abdomen is soft. There is no abdominal tenderness. There is no rebound and no guarding.   Musculoskeletal:         General: No tenderness or edema.      Cervical back: Normal range of motion and neck supple.     Neurological: She is alert and oriented to person, place, and time. She has normal strength. No cranial nerve deficit or sensory deficit. GCS score is 15. GCS eye subscore is 4. GCS verbal subscore is 5. GCS motor subscore is 6.   Skin: Skin is warm and dry. No rash noted.   Psychiatric: She has a normal mood and affect. Thought content normal.         ED Course    Procedures  Labs Reviewed   CBC W/ AUTO DIFFERENTIAL - Abnormal; Notable for the following components:       Result Value    Hemoglobin 11.0 (*)     Hematocrit 35.2 (*)     MCV 73 (*)     MCH 22.9 (*)     MCHC 31.3 (*)     RDW 15.3 (*)     All other components within normal limits   COMPREHENSIVE METABOLIC PANEL - Abnormal; Notable for the following components:    CO2 19 (*)     All other components within normal limits   TROPONIN I   B-TYPE NATRIURETIC PEPTIDE     EKG Readings: (Independently Interpreted)   Normal sinus rhythm at rate of 78, no STEMI, no ectopy.       Imaging Results              X-Ray Chest AP Portable (Final result)  Result time 10/26/23 03:08:08      Final result by Leana Locke MD (10/26/23 03:08:08)                   Impression:      No acute intrathoracic abnormality identified on this single radiographic view of the chest.      Electronically signed by: Leana Locke MD  Date:    10/26/2023  Time:    03:08               Narrative:    EXAMINATION:  XR CHEST AP PORTABLE    CLINICAL HISTORY:  Cough, unspecified    TECHNIQUE:  Single frontal view of the chest was performed.    COMPARISON:  None    FINDINGS:  The cardiomediastinal silhouette is within normal limits. Mediastinal structures are midline.  The lungs appear symmetrically expanded without definite evidence of confluent airspace consolidation, significant volume of pleural fluid or pneumothorax.  Visualized osseous structures are intact.                                    X-Rays:   Independently Interpreted Readings:   Chest X-Ray: No infiltrates, effusion, or pneumothorax. No acute process. Will defer to the Radiologist's reading.         Medications   sodium chloride 0.9% bolus 1,000 mL 1,000 mL (1,000 mLs Intravenous New Bag 10/26/23 0625)   albuterol sulfate nebulizer solution 2.5 mg (2.5 mg Nebulization Given 10/26/23 6311)     Medical Decision Making  23-year-old female presents with complaint of cough and associated chest  pain, ongoing after preceding URI with other symptoms resolved.  Vital signs are benign, afebrile.  She has normal room air pulse oximetry and denies shortness of breath.  On exam there is no abnormal heart or lung sounds.  She is not observed to be coughing while in the ED. labs showed mild anemia but no leukocytosis on CBC, CMP without major electrolyte disturbance or azotemia.  Chest x-ray shows no pneumonia or pneumothorax, pleural effusion.  EKG showed no dysrhythmia or ischemia, I doubt acute MI. I suspect chest pain is pulmonary in origin related to coughing.  I considered but doubt pulmonary embolism.  Screening troponin and BNP are reviewed and negative.  Patient was treated with an albuterol neb with symptomatic improvement.  She is discharged in good condition with prescriptions for Tessalon, albuterol inhaler, Flonase nose spray, and ibuprofen.  I do suspect a postnasal drip component to her cough.  She is advised close follow-up with her PCP and given strict return precautions.    Amount and/or Complexity of Data Reviewed  Labs: ordered.  Radiology: ordered. Decision-making details documented in ED Course.    Risk  Prescription drug management.            Scribe Attestation:   Scribe #1: I performed the above scribed service and the documentation accurately describes the services I performed. I attest to the accuracy of the note.    Physician Attestation for Scribe: I, Radha Diaz, reviewed documentation as scribed in my presence, which is both accurate and complete.       ED Course as of 11/02/23 1529   Thu Oct 26, 2023   0414 X-Ray Chest AP Portable [AK]      ED Course User Index  [AK] Radha Diaz MD                    Clinical Impression:   Final diagnoses:  [R05.9] Cough (Primary)  [R07.89] Chest wall pain        ED Disposition Condition    Discharge Stable          ED Prescriptions       Medication Sig Dispense Start Date End Date Auth. Provider    benzonatate (TESSALON) 100 MG capsule Take  1 capsule (100 mg total) by mouth 3 (three) times daily as needed for Cough. 20 capsule 10/26/2023 11/5/2023 Radha Diaz MD    albuterol (PROVENTIL/VENTOLIN HFA) 90 mcg/actuation inhaler Inhale 1-2 puffs into the lungs every 4 (four) hours as needed for Wheezing or Shortness of Breath. Rescue 8 g 10/26/2023 10/25/2024 Radha Diaz MD    ibuprofen (ADVIL,MOTRIN) 600 MG tablet Take 1 tablet (600 mg total) by mouth every 6 (six) hours as needed for Pain. 20 tablet 10/26/2023 -- Radha Diaz MD    fluticasone propionate (FLONASE) 50 mcg/actuation nasal spray 1 spray (50 mcg total) by Each Nostril route 2 (two) times daily as needed for Rhinitis. 15 g 10/26/2023 -- Radha Diaz MD          Follow-up Information       Follow up With Specialties Details Why Contact Info    Your regular primary care doctor  Schedule an appointment as soon as possible for a visit  For symptom recheck and close follow-up     [facility] - Emergency Dept Emergency Medicine  As needed, If symptoms worsen [address]  [facility] [address] [phone]             Radha Diaz MD  11/02/23 3820

## 2024-05-08 ENCOUNTER — LAB VISIT (OUTPATIENT)
Dept: LAB | Facility: HOSPITAL | Age: 24
End: 2024-05-08
Attending: INTERNAL MEDICINE
Payer: COMMERCIAL

## 2024-05-08 ENCOUNTER — OFFICE VISIT (OUTPATIENT)
Dept: INTERNAL MEDICINE | Facility: CLINIC | Age: 24
End: 2024-05-08
Payer: COMMERCIAL

## 2024-05-08 VITALS
DIASTOLIC BLOOD PRESSURE: 84 MMHG | WEIGHT: 242.06 LBS | BODY MASS INDEX: 47.52 KG/M2 | RESPIRATION RATE: 18 BRPM | HEIGHT: 60 IN | HEART RATE: 84 BPM | SYSTOLIC BLOOD PRESSURE: 132 MMHG | OXYGEN SATURATION: 97 % | TEMPERATURE: 98 F

## 2024-05-08 DIAGNOSIS — Z00.00 ANNUAL PHYSICAL EXAM: Primary | ICD-10-CM

## 2024-05-08 DIAGNOSIS — Z00.00 ANNUAL PHYSICAL EXAM: ICD-10-CM

## 2024-05-08 DIAGNOSIS — Z11.8 ENCOUNTER FOR SCREENING EXAMINATION FOR CHLAMYDIAL INFECTION: ICD-10-CM

## 2024-05-08 DIAGNOSIS — E66.01 MORBID OBESITY WITH BMI OF 45.0-49.9, ADULT: ICD-10-CM

## 2024-05-08 DIAGNOSIS — Z83.49 FAMILY HISTORY OF THYROID DISEASE: ICD-10-CM

## 2024-05-08 DIAGNOSIS — D64.9 ANEMIA, UNSPECIFIED TYPE: ICD-10-CM

## 2024-05-08 DIAGNOSIS — J45.20 MILD INTERMITTENT ASTHMA WITHOUT COMPLICATION: ICD-10-CM

## 2024-05-08 DIAGNOSIS — F41.1 GENERALIZED ANXIETY DISORDER: ICD-10-CM

## 2024-05-08 LAB
ALBUMIN SERPL BCP-MCNC: 3.5 G/DL (ref 3.5–5.2)
ALP SERPL-CCNC: 63 U/L (ref 55–135)
ALT SERPL W/O P-5'-P-CCNC: 13 U/L (ref 10–44)
ANION GAP SERPL CALC-SCNC: 9 MMOL/L (ref 8–16)
AST SERPL-CCNC: 20 U/L (ref 10–40)
BASOPHILS # BLD AUTO: 0.02 K/UL (ref 0–0.2)
BASOPHILS NFR BLD: 0.3 % (ref 0–1.9)
BILIRUB SERPL-MCNC: 0.5 MG/DL (ref 0.1–1)
BUN SERPL-MCNC: 9 MG/DL (ref 6–20)
CALCIUM SERPL-MCNC: 9.5 MG/DL (ref 8.7–10.5)
CHLORIDE SERPL-SCNC: 109 MMOL/L (ref 95–110)
CHOLEST SERPL-MCNC: 160 MG/DL (ref 120–199)
CHOLEST/HDLC SERPL: 3 {RATIO} (ref 2–5)
CO2 SERPL-SCNC: 21 MMOL/L (ref 23–29)
CREAT SERPL-MCNC: 0.7 MG/DL (ref 0.5–1.4)
DIFFERENTIAL METHOD BLD: ABNORMAL
EOSINOPHIL # BLD AUTO: 0.3 K/UL (ref 0–0.5)
EOSINOPHIL NFR BLD: 5.4 % (ref 0–8)
ERYTHROCYTE [DISTWIDTH] IN BLOOD BY AUTOMATED COUNT: 15.4 % (ref 11.5–14.5)
EST. GFR  (NO RACE VARIABLE): >60 ML/MIN/1.73 M^2
ESTIMATED AVG GLUCOSE: 111 MG/DL (ref 68–131)
FERRITIN SERPL-MCNC: 12 NG/ML (ref 20–300)
GLUCOSE SERPL-MCNC: 76 MG/DL (ref 70–110)
HBA1C MFR BLD: 5.5 % (ref 4–5.6)
HCT VFR BLD AUTO: 35.2 % (ref 37–48.5)
HDLC SERPL-MCNC: 54 MG/DL (ref 40–75)
HDLC SERPL: 33.8 % (ref 20–50)
HGB BLD-MCNC: 10.6 G/DL (ref 12–16)
IMM GRANULOCYTES # BLD AUTO: 0.01 K/UL (ref 0–0.04)
IMM GRANULOCYTES NFR BLD AUTO: 0.2 % (ref 0–0.5)
INSULIN COLLECTION INTERVAL: NORMAL
INSULIN SERPL-ACNC: 8.8 UU/ML
IRON SERPL-MCNC: 22 UG/DL (ref 30–160)
LDLC SERPL CALC-MCNC: 95.6 MG/DL (ref 63–159)
LYMPHOCYTES # BLD AUTO: 2.7 K/UL (ref 1–4.8)
LYMPHOCYTES NFR BLD: 44 % (ref 18–48)
MCH RBC QN AUTO: 22.6 PG (ref 27–31)
MCHC RBC AUTO-ENTMCNC: 30.1 G/DL (ref 32–36)
MCV RBC AUTO: 75 FL (ref 82–98)
MONOCYTES # BLD AUTO: 0.5 K/UL (ref 0.3–1)
MONOCYTES NFR BLD: 8.3 % (ref 4–15)
NEUTROPHILS # BLD AUTO: 2.6 K/UL (ref 1.8–7.7)
NEUTROPHILS NFR BLD: 41.8 % (ref 38–73)
NONHDLC SERPL-MCNC: 106 MG/DL
NRBC BLD-RTO: 0 /100 WBC
PLATELET # BLD AUTO: 357 K/UL (ref 150–450)
PMV BLD AUTO: 11.6 FL (ref 9.2–12.9)
POTASSIUM SERPL-SCNC: 3.8 MMOL/L (ref 3.5–5.1)
PROT SERPL-MCNC: 7.6 G/DL (ref 6–8.4)
RBC # BLD AUTO: 4.69 M/UL (ref 4–5.4)
SATURATED IRON: 5 % (ref 20–50)
SODIUM SERPL-SCNC: 139 MMOL/L (ref 136–145)
THYROGLOB AB SERPL IA-ACNC: <4 IU/ML (ref 0–3.9)
THYROPEROXIDASE IGG SERPL-ACNC: <6 IU/ML
TOTAL IRON BINDING CAPACITY: 411 UG/DL (ref 250–450)
TRANSFERRIN SERPL-MCNC: 278 MG/DL (ref 200–375)
TRIGL SERPL-MCNC: 52 MG/DL (ref 30–150)
TSH SERPL DL<=0.005 MIU/L-ACNC: 1.27 UIU/ML (ref 0.4–4)
WBC # BLD AUTO: 6.13 K/UL (ref 3.9–12.7)

## 2024-05-08 PROCEDURE — 86800 THYROGLOBULIN ANTIBODY: CPT | Performed by: INTERNAL MEDICINE

## 2024-05-08 PROCEDURE — 86376 MICROSOMAL ANTIBODY EACH: CPT | Performed by: INTERNAL MEDICINE

## 2024-05-08 PROCEDURE — 99385 PREV VISIT NEW AGE 18-39: CPT | Mod: S$GLB,,, | Performed by: INTERNAL MEDICINE

## 2024-05-08 PROCEDURE — 1159F MED LIST DOCD IN RCRD: CPT | Mod: CPTII,S$GLB,, | Performed by: INTERNAL MEDICINE

## 2024-05-08 PROCEDURE — 1160F RVW MEDS BY RX/DR IN RCRD: CPT | Mod: CPTII,S$GLB,, | Performed by: INTERNAL MEDICINE

## 2024-05-08 PROCEDURE — 82728 ASSAY OF FERRITIN: CPT | Performed by: INTERNAL MEDICINE

## 2024-05-08 PROCEDURE — 80053 COMPREHEN METABOLIC PANEL: CPT | Performed by: INTERNAL MEDICINE

## 2024-05-08 PROCEDURE — 84445 ASSAY OF TSI GLOBULIN: CPT | Performed by: INTERNAL MEDICINE

## 2024-05-08 PROCEDURE — 84443 ASSAY THYROID STIM HORMONE: CPT | Performed by: INTERNAL MEDICINE

## 2024-05-08 PROCEDURE — 80061 LIPID PANEL: CPT | Performed by: INTERNAL MEDICINE

## 2024-05-08 PROCEDURE — 83525 ASSAY OF INSULIN: CPT | Performed by: INTERNAL MEDICINE

## 2024-05-08 PROCEDURE — 3075F SYST BP GE 130 - 139MM HG: CPT | Mod: CPTII,S$GLB,, | Performed by: INTERNAL MEDICINE

## 2024-05-08 PROCEDURE — 83036 HEMOGLOBIN GLYCOSYLATED A1C: CPT | Performed by: INTERNAL MEDICINE

## 2024-05-08 PROCEDURE — 3044F HG A1C LEVEL LT 7.0%: CPT | Mod: CPTII,S$GLB,, | Performed by: INTERNAL MEDICINE

## 2024-05-08 PROCEDURE — 83540 ASSAY OF IRON: CPT | Performed by: INTERNAL MEDICINE

## 2024-05-08 PROCEDURE — 99999 PR PBB SHADOW E&M-EST. PATIENT-LVL V: CPT | Mod: PBBFAC,,, | Performed by: INTERNAL MEDICINE

## 2024-05-08 PROCEDURE — 85025 COMPLETE CBC W/AUTO DIFF WBC: CPT | Performed by: INTERNAL MEDICINE

## 2024-05-08 PROCEDURE — 3008F BODY MASS INDEX DOCD: CPT | Mod: CPTII,S$GLB,, | Performed by: INTERNAL MEDICINE

## 2024-05-08 PROCEDURE — 3079F DIAST BP 80-89 MM HG: CPT | Mod: CPTII,S$GLB,, | Performed by: INTERNAL MEDICINE

## 2024-05-08 RX ORDER — ACETAMINOPHEN 500 MG
1000 TABLET ORAL EVERY 6 HOURS PRN
COMMUNITY
Start: 2024-01-11 | End: 2024-06-06

## 2024-05-08 RX ORDER — FLUOXETINE 10 MG/1
10 CAPSULE ORAL DAILY
Qty: 30 CAPSULE | Refills: 0 | Status: SHIPPED | OUTPATIENT
Start: 2024-05-08 | End: 2024-06-06 | Stop reason: SDUPTHER

## 2024-05-08 NOTE — PROGRESS NOTES
Subjective:     PCP: Mica, Primary Doctor    Dhara Cortes is a 24 y.o. female who presents for an annual exam.    Medical History:   Past Medical History:   Diagnosis Date    Asthma     House dust mite allergy 7/6/2023    Mild intermittent asthma without complication 7/6/2023       Family History: family history includes No Known Problems in her father; Thyroid cancer in her mother.    Surgical History:   Past Surgical History:   Procedure Laterality Date    TONSILLECTOMY  02/16/2024        Social History:  reports that she has never smoked. She has never used smokeless tobacco. She reports that she does not currently use alcohol. She reports that she does not use drugs.     Allergies: Review of patient's allergies indicates:  No Known Allergies    Medications:   Current Outpatient Medications   Medication Sig    acetaminophen (TYLENOL) 500 MG tablet Take 1,000 mg by mouth every 6 (six) hours as needed.    albuterol (VENTOLIN HFA) 90 mcg/actuation inhaler Inhale 2 puffs into the lungs every 6 (six) hours as needed for Wheezing. Rescue    budesonide-formoterol 160-4.5 mcg (SYMBICORT) 160-4.5 mcg/actuation HFAA Inhale 2 puffs into the lungs every 12 (twelve) hours. Controller    fluticasone propionate (FLONASE) 50 mcg/actuation nasal spray 1 spray (50 mcg total) by Each Nostril route 2 (two) times daily as needed for Rhinitis.    ibuprofen (ADVIL,MOTRIN) 600 MG tablet Take 1 tablet (600 mg total) by mouth every 6 (six) hours as needed for Pain.    ondansetron (ZOFRAN-ODT) 4 MG TbDL Take 1 tablet (4 mg total) by mouth every 6 (six) hours as needed (Nausea and vomiting).    (Magic mouthwash) 1:1:1 diphenhydrAMINE(Benadryl) 12.5mg/5ml liq, aluminum & magnesium hydroxide-simethicone (Maalox), LIDOcaine viscous 2% Swish and spit 10 mLs every 4 (four) hours as needed (throat burning). for mouth sores (Patient not taking: Reported on 5/8/2024)    albuterol (PROVENTIL/VENTOLIN HFA) 90 mcg/actuation inhaler Inhale 1-2  puffs into the lungs every 4 (four) hours as needed for Wheezing or Shortness of Breath. Rescue (Patient not taking: Reported on 5/8/2024)    azelastine (ASTELIN) 137 mcg (0.1 %) nasal spray 1 spray (137 mcg total) by Nasal route 2 (two) times daily.    FLUoxetine 10 MG capsule Take 1 capsule (10 mg total) by mouth once daily.    hydrOXYzine pamoate (VISTARIL) 25 MG Cap Take 1 capsule (25 mg total) by mouth every 6 (six) hours as needed (Anxiety). (Patient not taking: Reported on 5/8/2024)    naproxen (NAPROSYN) 500 MG tablet Take 1 tablet (500 mg total) by mouth 2 (two) times daily with meals. For pain (Patient not taking: Reported on 5/8/2024)     No current facility-administered medications for this visit.       Health Maintenance:   Health Maintenance Topics with due status: Not Due       Topic Last Completion Date    Influenza Vaccine Not Due     Lab Results   Component Value Date    HEPCAB Negative 09/24/2022    DSE32WVCN Negative 09/24/2022     Eye Exam:   due  Dental Exam: due  OB/GYN: due  Pap smear: due  HIV: 9/2022, neg  Hepatitis C  Ab: 9/2022, neg    Vaccinations:  Immunization History   Administered Date(s) Administered    Hepatitis B, Pediatric/Adolescent 2001, 2001, 2001    MMR 04/11/2002, 12/28/2005    Meningococcal Conjugate (MCV4P) 07/28/2017    Varicella 01/08/2002, 06/22/2010     Tetanus: due  Covid vaccine: not done    Body mass index is 47.28 kg/m².  Wt Readings from Last 3 Encounters:   05/08/24 109.8 kg (242 lb 1 oz)   10/26/23 88.5 kg (195 lb)   07/06/23 100.8 kg (222 lb 3.6 oz)   - is a pescatarian, eats lots of fruits and vegetables  - exercises with  4x weekly    Review of Systems   Constitutional:  Negative for chills, diaphoresis, fatigue and fever.   HENT:  Negative for congestion, dental problem, ear discharge, ear pain, postnasal drip, rhinorrhea, sinus pressure, sore throat and trouble swallowing.    Eyes:  Negative for redness and visual disturbance.    Respiratory:  Negative for cough, chest tightness and shortness of breath.    Cardiovascular:  Negative for chest pain, palpitations and leg swelling.   Gastrointestinal:  Positive for abdominal distention (right upper abdominal pain, intermittent), abdominal pain and nausea (develops nausea when she is anxious). Negative for blood in stool, constipation, diarrhea and vomiting.   Endocrine: Positive for cold intolerance.   Genitourinary:  Negative for decreased urine volume, dysuria, frequency and hematuria.   Musculoskeletal:  Positive for back pain (lower). Negative for arthralgias and myalgias.   Skin:  Negative for rash and wound.   Neurological:  Positive for headaches. Negative for dizziness, weakness and numbness.   Hematological:  Negative for adenopathy.   Psychiatric/Behavioral:  Positive for sleep disturbance (tried hydroxyzine but it made her groggy). Negative for dysphoric mood. The patient is nervous/anxious (has a therapist).         Hard time cleaning house, wash clothes, sleeps on couch instead of in room          Objective:     Physical Exam  Vitals reviewed.   Constitutional:       General: She is awake. She is not in acute distress.     Appearance: Normal appearance. She is well-developed and well-groomed. She is not diaphoretic.   HENT:      Head: Normocephalic and atraumatic.      Right Ear: Hearing, tympanic membrane, ear canal and external ear normal. Tympanic membrane is not erythematous or bulging.      Left Ear: Hearing, tympanic membrane, ear canal and external ear normal. Tympanic membrane is not erythematous or bulging.      Nose: Nose normal. No congestion.      Mouth/Throat:      Mouth: Mucous membranes are moist.      Tongue: No lesions.      Pharynx: Oropharynx is clear. Uvula midline. No oropharyngeal exudate or posterior oropharyngeal erythema.   Eyes:      General: Lids are normal. Vision grossly intact. Gaze aligned appropriately. No scleral icterus.     Conjunctiva/sclera:       Right eye: Right conjunctiva is not injected.      Left eye: Left conjunctiva is not injected.      Pupils: Pupils are equal, round, and reactive to light.   Neck:      Thyroid: No thyroid mass or thyromegaly.   Cardiovascular:      Rate and Rhythm: Normal rate and regular rhythm.      Pulses: Normal pulses.      Heart sounds: Normal heart sounds. No murmur heard.  Pulmonary:      Effort: Pulmonary effort is normal. No respiratory distress.      Breath sounds: Normal breath sounds. No decreased breath sounds or wheezing.   Abdominal:      General: Bowel sounds are normal. There is no distension.      Palpations: Abdomen is soft. Abdomen is not rigid.      Tenderness: There is no abdominal tenderness. There is no guarding or rebound.   Musculoskeletal:         General: Normal range of motion.      Cervical back: Normal range of motion and neck supple.      Right lower leg: No edema.      Left lower leg: No edema.   Lymphadenopathy:      Cervical: No cervical adenopathy.      Upper Body:      Right upper body: No supraclavicular adenopathy.      Left upper body: No supraclavicular adenopathy.   Skin:     General: Skin is warm and dry.      Coloration: Skin is not cyanotic.      Findings: No lesion or rash.      Nails: There is no clubbing.   Neurological:      General: No focal deficit present.      Mental Status: She is alert and oriented to person, place, and time.      Sensory: Sensation is intact.      Coordination: Coordination is intact.      Gait: Gait is intact.      Deep Tendon Reflexes: Reflexes are normal and symmetric.   Psychiatric:         Attention and Perception: Attention normal.         Mood and Affect: Mood is anxious and depressed. Affect is tearful.         Behavior: Behavior is cooperative.              Assessment:        1. Annual physical exam    2. Mild intermittent asthma without complication    3. Generalized anxiety disorder    4. Anemia, unspecified type    5. Family history of thyroid  disease    6. Encounter for screening examination for chlamydial infection    7. Morbid obesity with BMI of 45.0-49.9, adult           Plan:     1. Annual physical exam  - CBC Auto Differential; Future  - Comprehensive Metabolic Panel; Future  - Lipid Panel; Future  - TSH; Future  - Urinalysis; Future  - Hemoglobin A1C; Future  - Ambulatory referral/consult to Obstetrics / Gynecology; Future    2. Mild intermittent asthma without complication  - continue SymbicortBID and albuterol as needed  - Ambulatory referral/consult to Pulmonology; Future    3. Generalized anxiety disorder  - will try Prozac 10mg daily  - FLUoxetine 10 MG capsule; Take 1 capsule (10 mg total) by mouth once daily.  Dispense: 30 capsule; Refill: 0  - if no improvement after 3 weeks, will increase the dose    4. Anemia, unspecified type  - CBC Auto Differential; Future  - Iron and TIBC; Future  - Ferritin; Future    5. Family history of thyroid disease  - TSH; Future  - Anti-Thyroglobulin Antibody; Future  - Thyroid Peroxidase Antibody; Future  - Thyroid Stimulating Immunoglobulin; Future    6. Encounter for screening examination for chlamydial infection  - C. trachomatis/N. gonorrhoeae by AMP DNA; Future    7. Morbid obesity with BMI of 45.0-49.9, adult  - Insulin, random; Future      RTC in 2 months for follow-up or sooner if needed    __________________________    No Smith MD, PharmD  [facility]- Internal Medicine  American Board of Obesity Medicine diplomate  Office [phone]

## 2024-05-13 LAB — TSI SER-ACNC: <0.1 IU/L

## 2024-06-01 PROBLEM — F41.1 GENERALIZED ANXIETY DISORDER: Status: ACTIVE | Noted: 2024-06-01

## 2024-06-01 PROBLEM — E66.01 MORBID OBESITY WITH BMI OF 45.0-49.9, ADULT: Status: ACTIVE | Noted: 2024-06-01

## 2024-06-01 PROBLEM — G47.00 INSOMNIA: Status: ACTIVE | Noted: 2024-06-01

## 2024-06-06 ENCOUNTER — PATIENT MESSAGE (OUTPATIENT)
Dept: INTERNAL MEDICINE | Facility: CLINIC | Age: 24
End: 2024-06-06
Payer: COMMERCIAL

## 2024-06-06 ENCOUNTER — OFFICE VISIT (OUTPATIENT)
Dept: INTERNAL MEDICINE | Facility: CLINIC | Age: 24
End: 2024-06-06
Payer: COMMERCIAL

## 2024-06-06 VITALS
HEART RATE: 75 BPM | OXYGEN SATURATION: 99 % | TEMPERATURE: 98 F | SYSTOLIC BLOOD PRESSURE: 120 MMHG | DIASTOLIC BLOOD PRESSURE: 72 MMHG | HEIGHT: 60 IN | BODY MASS INDEX: 47.26 KG/M2 | RESPIRATION RATE: 16 BRPM | WEIGHT: 240.75 LBS

## 2024-06-06 DIAGNOSIS — E66.01 MORBID OBESITY WITH BMI OF 45.0-49.9, ADULT: ICD-10-CM

## 2024-06-06 DIAGNOSIS — D50.9 IRON DEFICIENCY ANEMIA, UNSPECIFIED IRON DEFICIENCY ANEMIA TYPE: ICD-10-CM

## 2024-06-06 DIAGNOSIS — F41.1 GENERALIZED ANXIETY DISORDER: Primary | ICD-10-CM

## 2024-06-06 PROCEDURE — 1159F MED LIST DOCD IN RCRD: CPT | Mod: CPTII,S$GLB,, | Performed by: INTERNAL MEDICINE

## 2024-06-06 PROCEDURE — 3044F HG A1C LEVEL LT 7.0%: CPT | Mod: CPTII,S$GLB,, | Performed by: INTERNAL MEDICINE

## 2024-06-06 PROCEDURE — 99999 PR PBB SHADOW E&M-EST. PATIENT-LVL IV: CPT | Mod: PBBFAC,,, | Performed by: INTERNAL MEDICINE

## 2024-06-06 PROCEDURE — 3078F DIAST BP <80 MM HG: CPT | Mod: CPTII,S$GLB,, | Performed by: INTERNAL MEDICINE

## 2024-06-06 PROCEDURE — 99214 OFFICE O/P EST MOD 30 MIN: CPT | Mod: S$GLB,,, | Performed by: INTERNAL MEDICINE

## 2024-06-06 PROCEDURE — 1160F RVW MEDS BY RX/DR IN RCRD: CPT | Mod: CPTII,S$GLB,, | Performed by: INTERNAL MEDICINE

## 2024-06-06 PROCEDURE — 3074F SYST BP LT 130 MM HG: CPT | Mod: CPTII,S$GLB,, | Performed by: INTERNAL MEDICINE

## 2024-06-06 PROCEDURE — 3008F BODY MASS INDEX DOCD: CPT | Mod: CPTII,S$GLB,, | Performed by: INTERNAL MEDICINE

## 2024-06-06 RX ORDER — FLUOXETINE 10 MG/1
10 CAPSULE ORAL DAILY
Qty: 30 CAPSULE | Refills: 3 | Status: SHIPPED | OUTPATIENT
Start: 2024-06-06 | End: 2024-07-05 | Stop reason: SDUPTHER

## 2024-06-06 NOTE — PROGRESS NOTES
"    Subjective:     Dhara Cortes is a 24 y.o. female who presents for   Chief Complaint   Patient presents with    Follow-up    Anxiety    Anemia    Obesity       HPI      Anxiety: She presents for follow up of anxiety disorder. Current symptoms: irritable. She denies current suicidal and homicidal ideation. She complains of the following side effects from the treatment: none.    Anemia: The patient presents for evaluation of iron deficiency anemia. It has been present for several  years .  The anemia is likely related to: Iron deficiency anemia due to chronic blood loss from menstrual cycle . Current treatments: none. Associated signs & symptoms: none.    Pulmonology appointment on 6/18/24    Obesity:  Body mass index is 47.02 kg/m².  Wt Readings from Last 3 Encounters:   07/05/24 109.3 kg (240 lb 15.4 oz)   06/06/24 109.2 kg (240 lb 11.9 oz)   05/08/24 109.8 kg (242 lb 1 oz)   - has been intermittent fasting  - enjoys "comfort food", craves milkshakes       Review of Systems   Constitutional:  Positive for fatigue. Negative for chills, diaphoresis and fever.   HENT:  Negative for congestion, ear pain, sinus pressure and sore throat.    Eyes:  Negative for discharge and visual disturbance.   Respiratory:  Positive for shortness of breath and wheezing. Negative for cough and chest tightness.    Cardiovascular:  Negative for chest pain and palpitations.   Gastrointestinal:  Negative for abdominal pain, constipation, diarrhea, nausea and vomiting.   Genitourinary:  Negative for dysuria and urgency.   Musculoskeletal:  Negative for arthralgias and myalgias.   Skin:  Negative for rash and wound.   Neurological:  Negative for dizziness, tremors and headaches.   Psychiatric/Behavioral:  Negative for dysphoric mood. The patient is nervous/anxious.         She joined a book club and has been avoiding social media           Objective:     Physical Exam  Vitals reviewed.   Constitutional:       General: She is awake. She is " not in acute distress.     Appearance: Normal appearance. She is well-developed and well-groomed.   HENT:      Head: Normocephalic and atraumatic.      Right Ear: Hearing and external ear normal.      Left Ear: Hearing and external ear normal.      Nose: Nose normal. No congestion.      Mouth/Throat:      Mouth: Mucous membranes are moist.   Eyes:      General: Lids are normal. Vision grossly intact.   Cardiovascular:      Rate and Rhythm: Normal rate and regular rhythm.      Heart sounds: Normal heart sounds. No murmur heard.  Pulmonary:      Effort: Pulmonary effort is normal.      Breath sounds: Normal breath sounds. No decreased breath sounds or wheezing.   Abdominal:      General: Bowel sounds are normal. There is no distension.   Musculoskeletal:         General: Normal range of motion.      Cervical back: Normal range of motion. No rigidity. No pain with movement. Normal range of motion.      Right lower leg: No edema.      Left lower leg: No edema.   Skin:     General: Skin is warm and dry.      Findings: No lesion or rash.   Neurological:      Mental Status: She is alert and oriented to person, place, and time.   Psychiatric:         Attention and Perception: Attention normal.         Mood and Affect: Mood normal.         Behavior: Behavior is cooperative.            Assessment:      1. Generalized anxiety disorder    2. Iron deficiency anemia, unspecified iron deficiency anemia type    3. Morbid obesity with BMI of 45.0-49.9, adult           Plan:     1. Generalized anxiety disorder  - will start fluoxetine 10mg daily, monitor response    2. Iron deficiency anemia, unspecified iron deficiency anemia type  - will try Ferralet (ordered online) since she reports constipation with iron alone, check labs  - iron,carbon,gluc-FA-B12-C-dss (FERRALET 90 DUAL-IRON DELIVERY) 90-1-12-50 mg-mg-mcg-mg Tab; Take 1 tablet by mouth once daily.  Dispense: 30 tablet; Refill: 1  - CBC Auto Differential; Future  - Iron and  TIBC; Future  - Ferritin; Future    3. Morbid obesity with BMI of 45.0-49.9, adult  - discussed current eating habits and exercise, will monitor progress    RTC in 1 month for follow-up or sooner if needed    __________________________    No Smith MD, PharmD  [facility]- Internal Medicine  American Board of Obesity Medicine diplomate  Office [phone]

## 2024-06-23 ENCOUNTER — PATIENT MESSAGE (OUTPATIENT)
Dept: INTERNAL MEDICINE | Facility: CLINIC | Age: 24
End: 2024-06-23
Payer: COMMERCIAL

## 2024-07-05 ENCOUNTER — OFFICE VISIT (OUTPATIENT)
Dept: INTERNAL MEDICINE | Facility: CLINIC | Age: 24
End: 2024-07-05
Payer: COMMERCIAL

## 2024-07-05 ENCOUNTER — PATIENT MESSAGE (OUTPATIENT)
Dept: INTERNAL MEDICINE | Facility: CLINIC | Age: 24
End: 2024-07-05

## 2024-07-05 VITALS
TEMPERATURE: 98 F | HEART RATE: 65 BPM | BODY MASS INDEX: 47.3 KG/M2 | OXYGEN SATURATION: 99 % | WEIGHT: 240.94 LBS | RESPIRATION RATE: 20 BRPM | HEIGHT: 60 IN | DIASTOLIC BLOOD PRESSURE: 82 MMHG | SYSTOLIC BLOOD PRESSURE: 130 MMHG

## 2024-07-05 DIAGNOSIS — E66.01 MORBID OBESITY WITH BMI OF 45.0-49.9, ADULT: ICD-10-CM

## 2024-07-05 DIAGNOSIS — J45.20 MILD INTERMITTENT ASTHMA WITHOUT COMPLICATION: ICD-10-CM

## 2024-07-05 DIAGNOSIS — D50.9 IRON DEFICIENCY ANEMIA, UNSPECIFIED IRON DEFICIENCY ANEMIA TYPE: ICD-10-CM

## 2024-07-05 DIAGNOSIS — F41.1 GENERALIZED ANXIETY DISORDER: Primary | ICD-10-CM

## 2024-07-05 DIAGNOSIS — E66.01 MORBID OBESITY WITH BMI OF 45.0-49.9, ADULT: Primary | ICD-10-CM

## 2024-07-05 PROCEDURE — 1160F RVW MEDS BY RX/DR IN RCRD: CPT | Mod: CPTII,S$GLB,, | Performed by: INTERNAL MEDICINE

## 2024-07-05 PROCEDURE — 3075F SYST BP GE 130 - 139MM HG: CPT | Mod: CPTII,S$GLB,, | Performed by: INTERNAL MEDICINE

## 2024-07-05 PROCEDURE — 3079F DIAST BP 80-89 MM HG: CPT | Mod: CPTII,S$GLB,, | Performed by: INTERNAL MEDICINE

## 2024-07-05 PROCEDURE — 3008F BODY MASS INDEX DOCD: CPT | Mod: CPTII,S$GLB,, | Performed by: INTERNAL MEDICINE

## 2024-07-05 PROCEDURE — 99214 OFFICE O/P EST MOD 30 MIN: CPT | Mod: S$GLB,,, | Performed by: INTERNAL MEDICINE

## 2024-07-05 PROCEDURE — 1159F MED LIST DOCD IN RCRD: CPT | Mod: CPTII,S$GLB,, | Performed by: INTERNAL MEDICINE

## 2024-07-05 PROCEDURE — 3044F HG A1C LEVEL LT 7.0%: CPT | Mod: CPTII,S$GLB,, | Performed by: INTERNAL MEDICINE

## 2024-07-05 PROCEDURE — 99999 PR PBB SHADOW E&M-EST. PATIENT-LVL IV: CPT | Mod: PBBFAC,,, | Performed by: INTERNAL MEDICINE

## 2024-07-05 RX ORDER — FLUOXETINE HYDROCHLORIDE 20 MG/1
20 CAPSULE ORAL DAILY
Qty: 90 CAPSULE | Refills: 0 | Status: SHIPPED | OUTPATIENT
Start: 2024-07-05

## 2024-07-05 NOTE — PROGRESS NOTES
Subjective:     Dhara Cortes is a 24 y.o. female who presents for   Chief Complaint   Patient presents with    Follow-up    Anxiety    Anemia    Obesity       HPI      Anxiety: She presents for follow up of anxiety disorder. Current symptoms: feelings of losing control, racing thoughts. She denies current suicidal and homicidal ideation. She complains of the following side effects from the treatment: none.    Anemia: The patient presents for evaluation of anemia. Anemia was found by routine CBC.  It has been present for several  years .  The anemia is likely related to:  iron deficiency . Current treatments: Fe supplements Associated signs & symptoms: fatigue.    Obesity:  Body mass index is 47.06 kg/m².  Wt Readings from Last 3 Encounters:   07/05/24 109.3 kg (240 lb 15.4 oz)   06/06/24 109.2 kg (240 lb 11.9 oz)   05/08/24 109.8 kg (242 lb 1 oz)   - she has been walking regularly and decreased intake   - gained 40 lbs sin the last year  - discussed a GLP-1 Ag, patient will check with her insurance first  - intermittent fasting      Review of Systems   Constitutional:  Positive for fatigue. Negative for chills, diaphoresis and fever.   HENT:  Negative for congestion, ear pain, rhinorrhea and sinus pressure.    Eyes:  Negative for discharge and visual disturbance.   Respiratory:  Negative for cough and shortness of breath (asthma is stable).    Cardiovascular:  Negative for chest pain and palpitations.   Gastrointestinal:  Negative for abdominal pain, constipation, diarrhea, nausea and vomiting.   Musculoskeletal:  Negative for arthralgias and myalgias.   Skin:  Negative for rash and wound.   Neurological:  Positive for headaches (occasional). Negative for dizziness, tremors and numbness.   Psychiatric/Behavioral:  Negative for dysphoric mood. The patient is nervous/anxious.           Objective:     Physical Exam  Vitals reviewed.   Constitutional:       General: She is awake. She is not in acute distress.      Appearance: Normal appearance. She is well-developed and well-groomed.   HENT:      Head: Normocephalic and atraumatic.      Right Ear: Hearing and external ear normal.      Left Ear: Hearing and external ear normal.      Nose: Nose normal. No congestion.      Mouth/Throat:      Mouth: Mucous membranes are moist.   Eyes:      General: Lids are normal. Vision grossly intact.   Cardiovascular:      Rate and Rhythm: Normal rate and regular rhythm.      Heart sounds: Normal heart sounds. No murmur heard.  Pulmonary:      Effort: Pulmonary effort is normal.      Breath sounds: Normal breath sounds. No decreased breath sounds or wheezing.   Abdominal:      General: Bowel sounds are normal. There is no distension.   Musculoskeletal:         General: Normal range of motion.      Cervical back: Normal range of motion.      Right lower leg: No edema.      Left lower leg: No edema.   Skin:     General: Skin is warm and dry.      Findings: No lesion or rash.   Neurological:      Mental Status: She is alert and oriented to person, place, and time.   Psychiatric:         Attention and Perception: Attention normal.         Mood and Affect: Mood normal.         Behavior: Behavior is cooperative.            Assessment:      1. Generalized anxiety disorder    2. Iron deficiency anemia, unspecified iron deficiency anemia type    3. Morbid obesity with BMI of 45.0-49.9, adult           Plan:     1. Generalized anxiety disorder  - increase Prozac to 20mg/d, monitor response  - FLUoxetine 20 MG capsule; Take 1 capsule (20 mg total) by mouth once daily.  Dispense: 90 capsule; Refill: 0    2. Iron deficiency anemia, unspecified iron deficiency anemia type  - continue iron supplement, will check iron studies     3. Mild intermittent asthma without complication  - has an appointment with Pulmonology,     4. Morbid obesity with BMI of 45.0-49.9, adult  - patient is considering additional help with weight loss, she will check with insurance  about coverage    RTC in 2 months for follow-up or sooner if needed    __________________________    No Smith MD, PharmD  [facility]  American Board of Obesity Medicine diplomate  Office [phone]

## 2024-07-05 NOTE — TELEPHONE ENCOUNTER
Phentermine 8gm qam daily for 14 days. Checked [place] .    Please let her know that she can print a coupon at [employer] and get a discount since this medication is not covered by insurance. It should be pretty cheap.

## 2024-07-15 ENCOUNTER — PATIENT MESSAGE (OUTPATIENT)
Dept: INTERNAL MEDICINE | Facility: CLINIC | Age: 24
End: 2024-07-15
Payer: COMMERCIAL

## 2024-07-15 DIAGNOSIS — E66.01 MORBID OBESITY WITH BMI OF 45.0-49.9, ADULT: Primary | ICD-10-CM

## 2024-07-15 NOTE — TELEPHONE ENCOUNTER
LOV 7-5-24    Pt sent an update:  She's almost finished phentermine 8 mg for the first two weeks. She have not noticed any difference in her appetite. She's not having any side effects and is asking if a higher dose ir recommended.    Please advise  thanks

## 2024-07-16 NOTE — TELEPHONE ENCOUNTER
Increase phentermine to 8mg before breakfast and before lunch. Checked [place]     Start documenting her weight in [place] every week.   Go To:  Menu --> Track My Health--> Patient Entered Weight    She should also be checking BP readings daily and should report them a few days after she starts the higher dose of phentermine.

## 2024-08-06 ENCOUNTER — LAB VISIT (OUTPATIENT)
Dept: LAB | Facility: HOSPITAL | Age: 24
End: 2024-08-06
Attending: INTERNAL MEDICINE
Payer: COMMERCIAL

## 2024-08-06 DIAGNOSIS — D50.9 IRON DEFICIENCY ANEMIA, UNSPECIFIED IRON DEFICIENCY ANEMIA TYPE: ICD-10-CM

## 2024-08-06 LAB
BASOPHILS # BLD AUTO: 0.03 K/UL (ref 0–0.2)
BASOPHILS NFR BLD: 0.2 % (ref 0–1.9)
DIFFERENTIAL METHOD BLD: ABNORMAL
EOSINOPHIL # BLD AUTO: 0.1 K/UL (ref 0–0.5)
EOSINOPHIL NFR BLD: 0.7 % (ref 0–8)
ERYTHROCYTE [DISTWIDTH] IN BLOOD BY AUTOMATED COUNT: 16.6 % (ref 11.5–14.5)
FERRITIN SERPL-MCNC: 13 NG/ML (ref 20–300)
HCT VFR BLD AUTO: 36.8 % (ref 37–48.5)
HGB BLD-MCNC: 10.7 G/DL (ref 12–16)
IMM GRANULOCYTES # BLD AUTO: 0.09 K/UL (ref 0–0.04)
IMM GRANULOCYTES NFR BLD AUTO: 0.6 % (ref 0–0.5)
IRON SERPL-MCNC: 34 UG/DL (ref 30–160)
LYMPHOCYTES # BLD AUTO: 5 K/UL (ref 1–4.8)
LYMPHOCYTES NFR BLD: 34.6 % (ref 18–48)
MCH RBC QN AUTO: 22.1 PG (ref 27–31)
MCHC RBC AUTO-ENTMCNC: 29.1 G/DL (ref 32–36)
MCV RBC AUTO: 76 FL (ref 82–98)
MONOCYTES # BLD AUTO: 0.9 K/UL (ref 0.3–1)
MONOCYTES NFR BLD: 6.4 % (ref 4–15)
NEUTROPHILS # BLD AUTO: 8.4 K/UL (ref 1.8–7.7)
NEUTROPHILS NFR BLD: 57.5 % (ref 38–73)
NRBC BLD-RTO: 0 /100 WBC
PLATELET # BLD AUTO: 346 K/UL (ref 150–450)
PMV BLD AUTO: 11.8 FL (ref 9.2–12.9)
RBC # BLD AUTO: 4.84 M/UL (ref 4–5.4)
SATURATED IRON: 9 % (ref 20–50)
TOTAL IRON BINDING CAPACITY: 392 UG/DL (ref 250–450)
TRANSFERRIN SERPL-MCNC: 265 MG/DL (ref 200–375)
WBC # BLD AUTO: 14.52 K/UL (ref 3.9–12.7)

## 2024-08-06 PROCEDURE — 36415 COLL VENOUS BLD VENIPUNCTURE: CPT | Mod: PO | Performed by: INTERNAL MEDICINE

## 2024-08-06 PROCEDURE — 85025 COMPLETE CBC W/AUTO DIFF WBC: CPT | Performed by: INTERNAL MEDICINE

## 2024-08-06 PROCEDURE — 82728 ASSAY OF FERRITIN: CPT | Performed by: INTERNAL MEDICINE

## 2024-08-06 PROCEDURE — 83540 ASSAY OF IRON: CPT | Performed by: INTERNAL MEDICINE

## 2024-08-17 RX ORDER — ALBUTEROL SULFATE 5 MG/ML
2.5 SOLUTION RESPIRATORY (INHALATION) EVERY 6 HOURS PRN
COMMUNITY
Start: 2024-08-03 | End: 2025-08-03

## 2024-08-28 ENCOUNTER — PATIENT MESSAGE (OUTPATIENT)
Dept: PULMONOLOGY | Facility: CLINIC | Age: 24
End: 2024-08-28
Payer: COMMERCIAL

## 2024-09-04 ENCOUNTER — PATIENT OUTREACH (OUTPATIENT)
Dept: ADMINISTRATIVE | Facility: HOSPITAL | Age: 24
End: 2024-09-04
Payer: COMMERCIAL

## 2024-09-04 NOTE — PROGRESS NOTES
Population Health Chart Review & Patient Outreach Details      Additional [facility] Health Notes:               Updates Requested / Reviewed:      Care Everywhere, , and Immunizations Reconciliation Completed or Queried: [place]         Health Maintenance Topics Overdue:      [facility] Score: 1     Cervical Cancer Screening                       Health Maintenance Topic(s) Outreach Outcomes & Actions Taken:    Primary Care Appt - Outreach Outcomes & Actions Taken  : Primary Care Appt Scheduled

## 2024-09-05 ENCOUNTER — OFFICE VISIT (OUTPATIENT)
Dept: INTERNAL MEDICINE | Facility: CLINIC | Age: 24
End: 2024-09-05
Payer: COMMERCIAL

## 2024-09-05 VITALS
SYSTOLIC BLOOD PRESSURE: 126 MMHG | HEIGHT: 60 IN | DIASTOLIC BLOOD PRESSURE: 77 MMHG | BODY MASS INDEX: 47.22 KG/M2 | OXYGEN SATURATION: 98 % | RESPIRATION RATE: 20 BRPM | TEMPERATURE: 98 F | WEIGHT: 240.5 LBS | HEART RATE: 68 BPM

## 2024-09-05 DIAGNOSIS — D50.9 IRON DEFICIENCY ANEMIA, UNSPECIFIED IRON DEFICIENCY ANEMIA TYPE: Primary | ICD-10-CM

## 2024-09-05 DIAGNOSIS — F41.1 GENERALIZED ANXIETY DISORDER: ICD-10-CM

## 2024-09-05 DIAGNOSIS — M54.9 MUSCULOSKELETAL BACK PAIN: ICD-10-CM

## 2024-09-05 DIAGNOSIS — E66.01 MORBID OBESITY WITH BMI OF 45.0-49.9, ADULT: ICD-10-CM

## 2024-09-05 PROCEDURE — 99214 OFFICE O/P EST MOD 30 MIN: CPT | Mod: S$GLB,,, | Performed by: INTERNAL MEDICINE

## 2024-09-05 PROCEDURE — 1160F RVW MEDS BY RX/DR IN RCRD: CPT | Mod: CPTII,S$GLB,, | Performed by: INTERNAL MEDICINE

## 2024-09-05 PROCEDURE — 1159F MED LIST DOCD IN RCRD: CPT | Mod: CPTII,S$GLB,, | Performed by: INTERNAL MEDICINE

## 2024-09-05 PROCEDURE — 3078F DIAST BP <80 MM HG: CPT | Mod: CPTII,S$GLB,, | Performed by: INTERNAL MEDICINE

## 2024-09-05 PROCEDURE — 3044F HG A1C LEVEL LT 7.0%: CPT | Mod: CPTII,S$GLB,, | Performed by: INTERNAL MEDICINE

## 2024-09-05 PROCEDURE — 3074F SYST BP LT 130 MM HG: CPT | Mod: CPTII,S$GLB,, | Performed by: INTERNAL MEDICINE

## 2024-09-05 PROCEDURE — 99999 PR PBB SHADOW E&M-EST. PATIENT-LVL IV: CPT | Mod: PBBFAC,,, | Performed by: INTERNAL MEDICINE

## 2024-09-05 PROCEDURE — 3008F BODY MASS INDEX DOCD: CPT | Mod: CPTII,S$GLB,, | Performed by: INTERNAL MEDICINE

## 2024-09-05 NOTE — PROGRESS NOTES
Subjective:     Dhara Cortes is a 24 y.o. female who presents for   Chief Complaint   Patient presents with    Follow-up    Anxiety    Anemia    Obesity       HPI      Anxiety: She presents for follow up of anxiety disorder. Current symptoms: none. She denies current suicidal and homicidal ideation. She complains of the following side effects from the treatment: none.    Anemia: The patient presents for follow-up of chronic anemia. The anemia is likely related to: Iron deficiency anemia due to chronic blood loss related to menstrual cycle . Current treatments: Fe supplements Associated signs & symptoms: none.    Obesity:  Body mass index is 46.97 kg/m².  Wt Readings from Last 3 Encounters:   09/05/24 109.1 kg (240 lb 8.4 oz)   07/05/24 109.3 kg (240 lb 15.4 oz)   06/06/24 109.2 kg (240 lb 11.9 oz)   - phentermine did not help so she stopped taking it  - tried intermittent fasting, limited intake but weight has not changed      Review of Systems   Constitutional:  Positive for fatigue. Negative for chills, diaphoresis and fever.   HENT:  Negative for congestion, ear pain and sinus pressure.    Eyes:  Negative for discharge and visual disturbance.   Respiratory:  Negative for cough and shortness of breath.    Cardiovascular:  Negative for chest pain, palpitations and leg swelling.   Gastrointestinal:  Negative for abdominal pain, constipation, diarrhea, nausea and vomiting.   Musculoskeletal:  Positive for back pain (lower back pain tat is chronic but has increased over the last few weeks). Negative for arthralgias and myalgias.   Skin:  Negative for rash and wound.   Neurological:  Negative for dizziness, tremors and headaches.   Psychiatric/Behavioral:  Negative for dysphoric mood and sleep disturbance. The patient is nervous/anxious.           Objective:     Physical Exam  Vitals reviewed.   Constitutional:       General: She is awake. She is not in acute distress.     Appearance: Normal appearance. She is  well-developed and well-groomed.   HENT:      Head: Normocephalic and atraumatic.      Right Ear: Hearing and external ear normal.      Left Ear: Hearing and external ear normal.      Nose: Nose normal. No congestion.      Mouth/Throat:      Mouth: Mucous membranes are moist.   Eyes:      General: Lids are normal. Vision grossly intact.   Cardiovascular:      Rate and Rhythm: Normal rate and regular rhythm.      Heart sounds: Normal heart sounds. No murmur heard.  Pulmonary:      Effort: Pulmonary effort is normal.      Breath sounds: Normal breath sounds. No decreased breath sounds or wheezing.   Abdominal:      General: Bowel sounds are normal. There is no distension.   Musculoskeletal:         General: Normal range of motion.      Cervical back: Normal range of motion. No spasms or tenderness.      Thoracic back: Spasms present. No tenderness or bony tenderness.      Lumbar back: Spasms present. No tenderness or bony tenderness.      Right lower leg: No edema.      Left lower leg: No edema.      Comments: No muscle tenderness to palpation    Skin:     General: Skin is warm and dry.      Findings: No lesion or rash.   Neurological:      Mental Status: She is alert and oriented to person, place, and time.   Psychiatric:         Attention and Perception: Attention normal.         Mood and Affect: Mood normal.         Behavior: Behavior is cooperative.            Assessment:      1. Iron deficiency anemia, unspecified iron deficiency anemia type    2. Generalized anxiety disorder    3. Musculoskeletal back pain    4. Morbid obesity with BMI of 45.0-49.9, adult           Plan:     1. Iron deficiency anemia, unspecified iron deficiency anemia type  - slight improvement with recent cbc and iron studies, continue to take Slow Fe    2. Generalized anxiety disorder  - improving with fluoxetine, will continue    3. Musculoskeletal back pain  - likely musculoskeletal, intermittent pain, discussed options and will refer to  PT  - Ambulatory referral/consult to Physical/Occupational Therapy; Future    4. Morbid obesity with BMI of 45.0-49.9, adult  - discussed all options and costs, patient would like to try Wegovy   - patient needs to make exercise a regular habit    __________________________    No Smith MD, PharmD  [facility]  American Board of Obesity Medicine diplomate  Office [phone]

## 2024-09-10 ENCOUNTER — PATIENT MESSAGE (OUTPATIENT)
Dept: INTERNAL MEDICINE | Facility: CLINIC | Age: 24
End: 2024-09-10
Payer: COMMERCIAL

## 2024-09-10 ENCOUNTER — CLINICAL SUPPORT (OUTPATIENT)
Dept: REHABILITATION | Facility: HOSPITAL | Age: 24
End: 2024-09-10
Payer: COMMERCIAL

## 2024-09-10 DIAGNOSIS — M54.50 CHRONIC BILATERAL LOW BACK PAIN WITHOUT SCIATICA: ICD-10-CM

## 2024-09-10 DIAGNOSIS — G89.29 CHRONIC BILATERAL LOW BACK PAIN WITHOUT SCIATICA: ICD-10-CM

## 2024-09-10 PROCEDURE — 97530 THERAPEUTIC ACTIVITIES: CPT

## 2024-09-10 PROCEDURE — 97161 PT EVAL LOW COMPLEX 20 MIN: CPT

## 2024-09-10 NOTE — PROGRESS NOTES
[facility]   Physical Therapy Initial Evaluation      Name: Dhara Cortes  Clinic Number: [id number]    Therapy Diagnosis: No diagnosis found.     Physician: No Smith MD    Physician Orders: PT Eval and Treat  Medical Diagnosis from Referral: M54.50,G89.29 (ICD-10-CM) - Chronic bilateral low back pain without sciatica  Evaluation Date: 9/10/2024  Authorization Period Expiration: 12/31/2024  Plan of Care Expiration: ***  Progress Note Due: ***  Date of Surgery: ***  Visit # / Visits authorized: 1/1   FOTO: 1/3    Precautions: {IP WOUND PRECAUTIONS OHS:30596}     Time In: ***  Time Out: ***  Total Billable Time: *** minutes    Subjective     Date of onset: On and off for 6 years ago    History of current condition - Dhara reports:   No mechanism of injury that she can recall  Stopped doing gymnastics about 6 years ago  Laying down, sitting, standing, bending   Constant pain  Very hard to find a position of comfort  Going to the gym 3x/week, working with a  since May  End of July but worsened about 2 weeks  Post-activity/workouts increases symptoms  Typically does not notice it when walking  No numbness or tingling  No radiating symptoms  Some right hip pain  Left>right back pain    Falls: No    Imaging: {Mri/ctscan/bone scan:62609}: ***    Prior Therapy: No  Social History: Lives alone in an apartment on the 2nd floor  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  Prior Level of Function: Chronic condition  Current Level of Function: ***    Pain:  Current 9/10, worst 10/10, best 2/10   Location: midline thoracic and bilateral low back  Description: Aching, Dull, and Throbbing  Aggravating Factors: {Causes; Pain:93789}  Easing Factors: Ibuprofen    Patients goals: to reduce her discomfort with static positions     Medical History:   Past Medical History:   Diagnosis Date    Asthma     House dust mite allergy 7/6/2023    Mild intermittent asthma without complication 7/6/2023      Surgical History:   Dhara Cortes  has a past surgical history that includes Tonsillectomy (02/16/2024).    Medications:   Dhara has a current medication list which includes the following prescription(s): albuterol, albuterol, burdock root, ergocalciferol (vitamin d2), fluoxetine, fluticasone propionate, ferralet 90 dual-iron delivery, and semaglutide (weight loss).    Allergies:   Review of patient's allergies indicates:  No Known Allergies     Objective      Observation: AAOx3    Gait analysis: decreased hip extension bilaterally, bilateral contralateral hip drop    Posture: flattened thoracic spine, excessive lumbar lordosis, anteriorly tilted pelvis    Functional movement:  DL Squat Test: low back pain, quad dominant, heels pop up  SLS Balance EO: right hip with left SL balance    Hip Range of Motion:   Left Passive Right Passive   Flexion 70 70   Extension *** ***   External Rotation *** ***   Internal Rotation *** ***     Lumbar:    Active range of motion  Pain/dysfunction with movement:   Flexion 100 no   Extension 80 Yes, midline   Left Side Bending 80 Yes, right-sided   Right Side Bending 80 no     Lower extremity manual muscle tests  Left  Right  Pain/dysfunction with movement   Iliopsoas 4-/5 4-/5    Glute max {AMB PT VESTIBULAR STRENGTH:54026} {AMB PT VESTIBULAR STRENGTH:68501}    Hip abduction 4-/5 3+/5    Hip internal rotation 4/5 4/5    Hip external rotation 4-/5 4-/5      Sensation: ***    Special Tests:  - Prone Instability Test: ***  - Bridge Test: ***    Lumbar Motor Control:  - BKFO: ***  - Marching: ***  - Heel slides: ***    Functional Stability Tests:  Axial compression: Activation most pronounced at L3  Elbow flexion: ***    SI Special Tests:   Distraction: (+)  Compression: (+) right   Thigh thrust: NT  Sacral thrust: (+)  Active SLR: (-) bilaterally    Joint Mobility: ***    Palpation: ***    Flexibility:  - Ely's test: L = *** ; R = ***  - Hamstring : L = *** ; R = ***  - Juan Luis's  test: L = *** ; R = ***  Sharif Test Left  Right   Iliopsoas *** ***   Rectus Femoris  *** ***       Intake Outcome Measure for FOTO Lumbar Spine Survey    Therapist reviewed FOTO scores for Dhara Cortes on 9/10/2024.   FOTO report - see Media section or FOTO account episode details.    Intake Score: ***%         Treatment     Total Treatment time (time-based codes) separate from Evaluation: *** minutes     Dhara received the treatments listed below:      therapeutic activities to improve functional performance for ***  minutes, including:  Home exercise program review:     manual therapy techniques: {AMB PT PROGRESS MANUAL THERAPY:02169} were applied to the: *** for *** minutes, including:  ***    Patient Education and Home Exercises     Education provided:   - Diagnosis and prognosis  - Plan of care  - Home exercise program    Written Home Exercises Provided: Yes. Exercises were reviewed and Dhara was able to demonstrate them prior to the end of the session.  Dhara demonstrated good  understanding of the education provided. See EMR under Patient Instructions for exercises provided during therapy sessions.    Assessment     Dhara is a 24 y.o. female referred to outpatient Physical Therapy with a medical diagnosis of M54.50,G89.29 (ICD-10-CM) - Chronic bilateral low back pain without sciatica. Patient presents with ***    Patient prognosis is {REHAB PROGNOSIS OHS:11499}.   Patient will benefit from skilled outpatient Physical Therapy to address the deficits stated above and in the chart below, provide patient /family education, and to maximize patientt's level of independence.     Plan of care discussed with patient: {YES:49105}  Patient's spiritual, cultural and educational needs considered and patient is agreeable to the plan of care and goals as stated below:     Anticipated Barriers for therapy: ***    Medical Necessity is demonstrated by the following  History  Co-morbidities and personal factors that  "may impact the plan of care [] LOW: no personal factors / co-morbidities  [] MODERATE: 1-2 personal factors / co-morbidities  [] HIGH: 3+ personal factors / co-morbidities    Moderate / High Support Documentation:   Co-morbidities affecting plan of care: ***    Personal Factors:   {Personal Factors:52493}     Examination  Body Structures and Functions, activity limitations and participation restrictions that may impact the plan of care [] LOW: addressing 1-2 elements  [] MODERATE: 3+ elements  [] HIGH: 4+ elements (please support below)    Moderate / High Support Documentation: ***     Clinical Presentation [] LOW: stable  [] MODERATE: Evolving  [] HIGH: Unstable     Decision Making/ Complexity Score: {Desc; low/moderate/high:413547}       Goals:  Short Term Goals (*** Weeks):  1. Patient will report decreased *** pain to </= ***/10 to increase tolerance for ***.   2. Patient will increase range of motion by *** degrees where limited in order to perform activities of daily living with less difficulty.   3. Patient will improve impaired lower extremity ***myotomes/***manual muscle tests by 1/3 grade to improve strength for functional tasks.   4. Patient will be compliant with home exercise program to supplement therapy in restoring functional mobility.     Long Term Goals (*** Weeks):   1. Patient will report decreased *** pain to </= ***/10 to increase tolerance for ***.   2. Patient will improve impaired lower extremity ***myotomes/***manual muscle tests to >/=4+/5 to improve strength for functional tasks.   3. Patient will improve FOTO score to >/= ***% to demonstrate decrease in perceived limitations with functional mobility.   4. Patient goal: ***.   5. Patient will perform *** with good control to demonstrate improved core strength.     Plan     Plan of care Certification: 9/10/2024 to ***.    Outpatient Physical Therapy {NUMBERS 1-5:30812} times weekly for {0-10:33509::"0"} weeks to include the following " interventions: {TX PLAN:84097}.     Son Rodriguez, PT        Physician's Signature: _________________________________________ Date: ________________

## 2024-09-14 PROBLEM — D50.9 IRON DEFICIENCY ANEMIA: Status: ACTIVE | Noted: 2024-09-14

## 2024-09-14 PROBLEM — M54.9 MUSCULOSKELETAL BACK PAIN: Status: ACTIVE | Noted: 2024-09-14

## 2024-09-16 ENCOUNTER — CLINICAL SUPPORT (OUTPATIENT)
Dept: REHABILITATION | Facility: HOSPITAL | Age: 24
End: 2024-09-16
Payer: COMMERCIAL

## 2024-09-16 DIAGNOSIS — G89.29 CHRONIC BILATERAL LOW BACK PAIN WITHOUT SCIATICA: Primary | ICD-10-CM

## 2024-09-16 DIAGNOSIS — M54.50 CHRONIC BILATERAL LOW BACK PAIN WITHOUT SCIATICA: Primary | ICD-10-CM

## 2024-09-16 PROCEDURE — 97112 NEUROMUSCULAR REEDUCATION: CPT

## 2024-09-16 PROCEDURE — 97140 MANUAL THERAPY 1/> REGIONS: CPT

## 2024-09-16 PROCEDURE — 97110 THERAPEUTIC EXERCISES: CPT

## 2024-09-16 NOTE — PROGRESS NOTES
"[facility]   Physical Therapy Treatment Note      Name: Dhara Cortes  Clinic Number: [id number]    Therapy Diagnosis:   Encounter Diagnosis   Name Primary?    Chronic bilateral low back pain without sciatica Yes     Physician: No Smith MD    Visit Date: 9/16/2024    Physician Orders: PT Eval and Treat  Medical Diagnosis from Referral: M54.50,G89.29 (ICD-10-CM) - Chronic bilateral low back pain without sciatica  Evaluation Date: 9/10/2024  Authorization Period Expiration: 12/31/2024  Plan of Care Expiration: 11/5/2024  Progress Note Due: 10/8/2024  Visit # / Visits authorized: 1/20 + EVAL   FOTO: 1/3     Precautions: Standard      Time In: 1602  Time Out: 1658  Total Billable Time: 56 minutes    Subjective     Patient reports: low back is doing okay. It feels a bit better than at initial eval.    She was compliant with home exercise program.  Response to previous treatment: First follow-up  Functional change: First follow-up    Pain: 7/10  Location: bilateral low back     Objective      Objective Measures updated at progress report unless specified.     Treatment     Dhara received the treatments listed below:      therapeutic exercises to develop strength, endurance, ROM, and flexibility for 8 minutes including:  Thoracic extension over foam roll: 20x with 3" holds  HHRs: 20x with 3" holds    manual therapy techniques were applied for 12 minutes, including:  Sidelying lumbar gapping, grade IV-V  Prone thoracic HVLAT  Long axis hip distraction, grade V    neuromuscular re-education activities to improve: Balance, Coordination, Kinesthetic, Sense, Proprioception, and Posture for 36 minutes. The following activities were included:  Posterior pelvic tilt: 10x with 10" holds  Bridges with abd: green theraband, 3x10  Sidelying clams: green theraband, 2x12 ea  Lateral stepping: red theraband around ankles, 3 laps x 10 yds  Standing pallof press: green sports cord, 2x12 " jagruti    Patient Education and Home Exercises       Education provided:   - Continue prior home exercise program    Written Home Exercises Provided: Pt instructed to continue prior HEP. Exercises were reviewed and Dhara was able to demonstrate them prior to the end of the session.  Dhara demonstrated good  understanding of the education provided. See Electronic Medical Record under Patient Instructions for exercises provided during therapy sessions    Assessment     Dhara presents to first follow-up session noting slight improvement in low back symptoms at arrival. Performed manual interventions to improve hip, thoracic, and lumbar mobility restrictions. Incorporated active mobility interventions to reinforce this. Therapeutic exercises were focused on improving core activation, glute strength, and anti-rotational core stability. She tolerated first session's treatment interventions well. Will progress as able.    Dhara Is progressing well towards her goals.   Patient prognosis is Good.     Patient will continue to benefit from skilled outpatient physical therapy to address the deficits listed in the problem list box on initial evaluation, provide pt/family education and to maximize pt's level of independence in the home and community environment.     Patient's spiritual, cultural and educational needs considered and pt agreeable to plan of care and goals.     Anticipated barriers to physical therapy: chronicity of condition    Goals:   Short Term Goals (4 Weeks):  1. Patient will report decreased low back pain to </= 6/10 to increase tolerance for activities of daily living. Progressing, not met  2. Patient will increase hip extension range of motion to >/= 10 degrees in order to perform activities of daily living with less difficulty. Progressing, not met  3. Patient will improve impaired lower extremity manual muscle tests by 1/3 grade to improve strength for functional tasks. Progressing, not met  4.  Patient will be compliant with home exercise program to supplement therapy in restoring functional mobility. Progressing, not met     Long Term Goals (8 Weeks):   1. Patient will report decreased low back pain to </= 3/10 to increase tolerance for activities of daily living. Progressing, not met  2. Patient will improve impaired lower extremity manual muscle tests to >/=4+/5 to improve strength for functional tasks. Progressing, not met  3. Patient will improve FOTO score to >/= 66% to demonstrate decrease in perceived limitations with functional mobility. Progressing, not met  4. Patient goal: to reduce her discomfort with static positions. Progressing, not met  5. Patient will perform 25# deadlift with good control to demonstrate improved core strength. Progressing, not met    Plan     Plan of care Certification: 9/10/2024 to 11/5/2024.     Outpatient Physical Therapy 2 times weekly for 8 weeks to include the following interventions: Electrical Stimulation , Gait Training, Manual Therapy, Moist Heat/ Ice, Neuromuscular Re-ed, Patient Education, Self Care, Therapeutic Activities, and Therapeutic Exercise.     Son Rodriguez, PT, DPT

## 2024-09-18 ENCOUNTER — CLINICAL SUPPORT (OUTPATIENT)
Dept: REHABILITATION | Facility: HOSPITAL | Age: 24
End: 2024-09-18
Attending: INTERNAL MEDICINE
Payer: COMMERCIAL

## 2024-09-18 DIAGNOSIS — M54.50 CHRONIC BILATERAL LOW BACK PAIN WITHOUT SCIATICA: Primary | ICD-10-CM

## 2024-09-18 DIAGNOSIS — G89.29 CHRONIC BILATERAL LOW BACK PAIN WITHOUT SCIATICA: Primary | ICD-10-CM

## 2024-09-18 PROCEDURE — 97140 MANUAL THERAPY 1/> REGIONS: CPT

## 2024-09-18 PROCEDURE — 97112 NEUROMUSCULAR REEDUCATION: CPT

## 2024-09-18 PROCEDURE — 97110 THERAPEUTIC EXERCISES: CPT

## 2024-09-18 NOTE — PROGRESS NOTES
"[facility]   Physical Therapy Treatment Note      Name: Dhara Cortes  Clinic Number: [id number]    Therapy Diagnosis:   Encounter Diagnosis   Name Primary?    Chronic bilateral low back pain without sciatica Yes     Physician: No Smith MD    Visit Date: 9/18/2024    Physician Orders: PT Eval and Treat  Medical Diagnosis from Referral: M54.50,G89.29 (ICD-10-CM) - Chronic bilateral low back pain without sciatica  Evaluation Date: 9/10/2024  Authorization Period Expiration: 12/31/2024  Plan of Care Expiration: 11/5/2024  Progress Note Due: 10/8/2024  Visit # / Visits authorized: 2/20 + EVAL   FOTO: 1/3     Precautions: Standard      Time In: 1700  Time Out: 1759  Total Billable Time: 59 minutes    Subjective     Patient reports: she is hurting quite a bit today. It was painful all throughout her workday.    She was compliant with home exercise program.  Response to previous treatment: no exacerbation of symptoms  Functional change: no significant change at this time    Pain: 8/10  Location: bilateral low back    Objective      Objective Measures updated at progress report unless specified.     Treatment     Dhara received the treatments listed below:      therapeutic exercises to develop strength, endurance, ROM, and flexibility for 10 minutes including:  Upright bike completed for 6' to increase ROM, endurance, and decrease pain to improve tolerance to ADLs and age-related activities  HHRs: 20x with 3" holds    Not today:  Thoracic extension over foam roll: 20x with 3" holds    manual therapy techniques were applied for 14 minutes, including:  Sidelying lumbar gapping, grade IV-V  Prone thoracic PA mobs, grade III-IV  Long axis hip distraction, grade V    neuromuscular re-education activities to improve: Balance, Coordination, Kinesthetic, Sense, Proprioception, and Posture for 35 minutes. The following activities were included:  TrA activation with BP cuff biofeedback: 10x " "with 10" holds at 46 mmHg  Bridges with abd: green theraband, 3x10  BKFO: green theraband, 2x10 ea  Shuttle kickback: 12.5#, 2x10 ea  Standing pallof press: green sports cord, 2x12 ea    Not today:  Lateral stepping: red theraband around ankles, 3 laps x 10 yds  Sidelying clams: green theraband, 2x12 ea    Patient Education and Home Exercises       Education provided:   - Continue prior home exercise program    Written Home Exercises Provided: Pt instructed to continue prior HEP. Exercises were reviewed and Dhara was able to demonstrate them prior to the end of the session.  Dhara demonstrated good  understanding of the education provided. See Electronic Medical Record under Patient Instructions for exercises provided during therapy sessions    Assessment     Dhara presents to therapy with increased low back pain throughout and following her work day. Manual interventions continue to be performed for symptom modulation and to improve mobility deficits found. She continues to respond well with this, noting less pain following techniques. Worked on core activation today with use of blood pressure biofeedback. Patient demonstrated increased difficulty performing lateral hip strengthening without compensating from her lumbar spine so added interventions to work on improving hip vs lumbar dissociation. Fairly good tolerance demonstrated to treatment. Will continue to progress as tolerated.    Dhara Is progressing well towards her goals.   Patient prognosis is Good.     Patient will continue to benefit from skilled outpatient physical therapy to address the deficits listed in the problem list box on initial evaluation, provide pt/family education and to maximize pt's level of independence in the home and community environment.     Patient's spiritual, cultural and educational needs considered and pt agreeable to plan of care and goals.     Anticipated barriers to physical therapy: chronicity of condition    Goals: "   Short Term Goals (4 Weeks):  1. Patient will report decreased low back pain to </= 6/10 to increase tolerance for activities of daily living. Progressing, not met  2. Patient will increase hip extension range of motion to >/= 10 degrees in order to perform activities of daily living with less difficulty. Progressing, not met  3. Patient will improve impaired lower extremity manual muscle tests by 1/3 grade to improve strength for functional tasks. Progressing, not met  4. Patient will be compliant with home exercise program to supplement therapy in restoring functional mobility. Progressing, not met     Long Term Goals (8 Weeks):   1. Patient will report decreased low back pain to </= 3/10 to increase tolerance for activities of daily living. Progressing, not met  2. Patient will improve impaired lower extremity manual muscle tests to >/=4+/5 to improve strength for functional tasks. Progressing, not met  3. Patient will improve FOTO score to >/= 66% to demonstrate decrease in perceived limitations with functional mobility. Progressing, not met  4. Patient goal: to reduce her discomfort with static positions. Progressing, not met  5. Patient will perform 25# deadlift with good control to demonstrate improved core strength. Progressing, not met    Plan     Plan of care Certification: 9/10/2024 to 11/5/2024.     Outpatient Physical Therapy 2 times weekly for 8 weeks to include the following interventions: Electrical Stimulation , Gait Training, Manual Therapy, Moist Heat/ Ice, Neuromuscular Re-ed, Patient Education, Self Care, Therapeutic Activities, and Therapeutic Exercise.     Son Rodriguez, PT, DPT

## 2024-09-22 ENCOUNTER — PATIENT MESSAGE (OUTPATIENT)
Dept: REHABILITATION | Facility: HOSPITAL | Age: 24
End: 2024-09-22
Payer: COMMERCIAL

## 2024-09-22 ENCOUNTER — PATIENT MESSAGE (OUTPATIENT)
Dept: INTERNAL MEDICINE | Facility: CLINIC | Age: 24
End: 2024-09-22
Payer: COMMERCIAL

## 2024-09-22 PROBLEM — M54.50 CHRONIC BILATERAL LOW BACK PAIN WITHOUT SCIATICA: Status: ACTIVE | Noted: 2024-09-22

## 2024-09-22 PROBLEM — G89.29 CHRONIC BILATERAL LOW BACK PAIN WITHOUT SCIATICA: Status: ACTIVE | Noted: 2024-09-22

## 2024-09-22 NOTE — PLAN OF CARE
[facility]   Physical Therapy Initial Evaluation      Name: Dhara Cortes  Clinic Number: [id number]    Therapy Diagnosis:   Encounter Diagnosis   Name Primary?    Chronic bilateral low back pain without sciatica         Physician: No Smith MD    Physician Orders: PT Eval and Treat  Medical Diagnosis from Referral: M54.50,G89.29 (ICD-10-CM) - Chronic bilateral low back pain without sciatica  Evaluation Date: 9/10/2024  Authorization Period Expiration: 12/31/2024  Plan of Care Expiration: 11/5/2024  Progress Note Due: 10/8/2024  Visit # / Visits authorized: 1/1   FOTO: 1/3    Precautions: Standard     Time In: 1700  Time Out: 1800  Total Billable Time: 60 minutes    Subjective     Date of onset: On and off for 6 years ago    History of current condition - Dahra reports: she has had on and off low back pain starting about 6 years ago. There was no mechanism of injury that she can recall; however, this is around the same time that she stopped doing gymnastics. She feels like her low back pain is constant. It increases with prolonged sitting/standing/laying down and bending. She find is very difficulty to find a position of comfort. She has been going to the gym 3x/week, working with a  since May. Symptoms reoccurred around the end of July but worsened about 2 weeks ago. She also feels like pain is worse post-activity/workouts but does not notice it much with walking or during activities. She denies numbness/tingling or radiating symptoms but does have some right hip pain. Symptoms are bilateral low back with left>right.    Falls: No    Imaging: None on file of lumbar spine    Prior Therapy: No  Social History: Lives alone in an apartment on the 2nd floor  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  Prior Level of Function: Chronic condition  Current Level of Function: Increased low back pain limiting activities of daily living    Pain:  Current 9/10, worst 10/10, best 2/10    Location: midline thoracic and bilateral low back  Description: Aching, Dull, and Throbbing  Aggravating Factors: Prolonged sitting/standing/laying down and bending  Easing Factors: Ibuprofen    Patients goals: to reduce her discomfort with static positions     Medical History:   Past Medical History:   Diagnosis Date    Asthma     House dust mite allergy 7/6/2023    Mild intermittent asthma without complication 7/6/2023     Surgical History:   Dhara Cortes  has a past surgical history that includes Tonsillectomy (02/16/2024).    Medications:   Dhara has a current medication list which includes the following prescription(s): albuterol, albuterol, burdock root, ergocalciferol (vitamin d2), fluoxetine, fluticasone propionate, and ferralet 90 dual-iron delivery.    Allergies:   Review of patient's allergies indicates:  No Known Allergies     Objective      Observation: AAOx3    Gait analysis: decreased hip extension bilaterally, bilateral contralateral hip drop    Posture: flattened thoracic spine, excessive lumbar lordosis, anteriorly tilted pelvis    Functional movement:  DL Squat Test: low back pain, quad dominant, heels pop up  SLS Balance EO: right hip drop with left SL balance    Hip Range of Motion:   Left Passive Right Passive   Flexion 70 70   Extension 5 0   External Rotation 50 50   Internal Rotation 35 35     Lumbar:    Active range of motion  Pain/dysfunction with movement:   Flexion 100 no   Extension 80 Yes, midline   Left Side Bending 80 Yes, right-sided   Right Side Bending 80 no     Lower extremity manual muscle tests  Left  Right  Pain/dysfunction with movement   Iliopsoas 4-/5 4-/5    Glute max 3-/5 3-/5    Hip abduction 4-/5 3+/5    Hip internal rotation 4/5 4/5    Hip external rotation 4-/5 4-/5      Sensation: intact    Special Tests:  - Prone Instability Test: (+) for decreased tenderness of L3 in legs raised position  - Bridge Test: -    Lumbar Motor Control: Not performed    Functional  Stability Tests:  Axial compression: Activation most pronounced at L3  Elbow flexion: Activation most pronounced at L3    SI Special Tests:   Distraction: (+)  Compression: (+) right   Thigh thrust: NT  Sacral thrust: (+)  Active SLR: (-) bilaterally    Joint Mobility: hypomobility of L4-5    Palpation: tenderness of L2 and L3 spinous processes    Flexibility: not assessed      Intake Outcome Measure for FOTO Lumbar Spine Survey    Therapist reviewed FOTO scores for Dhara Cortes on 9/10/2024.   FOTO report - see Media section or FOTO account episode details.    Intake Score: 43%         Treatment     Total Treatment time (time-based codes) separate from Evaluation: 15 minutes     Dhara received the treatments listed below:      therapeutic activities to improve functional performance for 10 minutes, including:  Home exercise program review:  Bridges: 10x  Sidelying clams: 10x  Standing pallof press: green theraband, 8x ea    manual therapy techniques were applied for 5 minutes, including:  Sidelying lumbar gapping, grade III-IV    Patient Education and Home Exercises     Education provided:   - Diagnosis and prognosis  - Plan of care  - Home exercise program    Written Home Exercises Provided: Yes. Exercises were reviewed and Dhara was able to demonstrate them prior to the end of the session.  Dhara demonstrated good  understanding of the education provided. See EMR under Patient Instructions for exercises provided during therapy sessions.    Assessment     Dhara is a 24 y.o. female referred to outpatient Physical Therapy with a medical diagnosis of M54.50,G89.29 (ICD-10-CM) - Chronic bilateral low back pain without sciatica. Patient presents with bilateral low back pain (right>left), decreased hip and lumbar range of motion, decreased lower extremity strength, positive prone instability test, as well as gait and postural deficits. These limitations are affecting her performance of activities of daily  living and leisure activities. Patient's symptoms appear consistent with lumbar facet dysfunction secondary to hypermobility. Plan to work to improve hip and lumbar mobility, glute strength, and core stability.    Patient prognosis is Good.   Patient will benefit from skilled outpatient Physical Therapy to address the deficits stated above and in the chart below, provide patient /family education, and to maximize patientt's level of independence.     Plan of care discussed with patient: Yes  Patient's spiritual, cultural and educational needs considered and patient is agreeable to the plan of care and goals as stated below:     Anticipated Barriers for therapy: chronicity of condition    Medical Necessity is demonstrated by the following  History  Co-morbidities and personal factors that may impact the plan of care [] LOW: no personal factors / co-morbidities  [] MODERATE: 1-2 personal factors / co-morbidities  [x] HIGH: 3+ personal factors / co-morbidities    Moderate / High Support Documentation:   Co-morbidities affecting plan of care: Anxiety or Panic Disorders, Asthma, BMI over 30    Personal Factors:   no deficits     Examination  Body Structures and Functions, activity limitations and participation restrictions that may impact the plan of care [] LOW: addressing 1-2 elements  [] MODERATE: 3+ elements  [x] HIGH: 4+ elements (please support below)    Moderate / High Support Documentation: range of motion, strength, gait, posture     Clinical Presentation [x] LOW: stable  [] MODERATE: Evolving  [] HIGH: Unstable     Decision Making/ Complexity Score: low       Goals:  Short Term Goals (4 Weeks):  1. Patient will report decreased low back pain to </= 6/10 to increase tolerance for activities of daily living.   2. Patient will increase hip extension range of motion to >/= 10 degrees in order to perform activities of daily living with less difficulty.   3. Patient will improve impaired lower extremity manual muscle  tests by 1/3 grade to improve strength for functional tasks.   4. Patient will be compliant with home exercise program to supplement therapy in restoring functional mobility.     Long Term Goals (8 Weeks):   1. Patient will report decreased low back pain to </= 3/10 to increase tolerance for activities of daily living.   2. Patient will improve impaired lower extremity manual muscle tests to >/=4+/5 to improve strength for functional tasks.   3. Patient will improve FOTO score to >/= 66% to demonstrate decrease in perceived limitations with functional mobility.   4. Patient goal: to reduce her discomfort with static positions.   5. Patient will perform 25# deadlift with good control to demonstrate improved core strength.     Plan     Plan of care Certification: 9/10/2024 to 11/5/2024.    Outpatient Physical Therapy 2 times weekly for 8 weeks to include the following interventions: Electrical Stimulation , Gait Training, Manual Therapy, Moist Heat/ Ice, Neuromuscular Re-ed, Patient Education, Self Care, Therapeutic Activities, and Therapeutic Exercise.     Son Rodriguez, PT, DPT        Physician's Signature: _________________________________________ Date: ________________

## 2024-09-23 ENCOUNTER — CLINICAL SUPPORT (OUTPATIENT)
Dept: REHABILITATION | Facility: HOSPITAL | Age: 24
End: 2024-09-23
Payer: COMMERCIAL

## 2024-09-23 DIAGNOSIS — M54.50 CHRONIC BILATERAL LOW BACK PAIN WITHOUT SCIATICA: Primary | ICD-10-CM

## 2024-09-23 DIAGNOSIS — G89.29 CHRONIC BILATERAL LOW BACK PAIN WITHOUT SCIATICA: Primary | ICD-10-CM

## 2024-09-23 PROCEDURE — 97140 MANUAL THERAPY 1/> REGIONS: CPT

## 2024-09-23 PROCEDURE — 97110 THERAPEUTIC EXERCISES: CPT

## 2024-09-23 PROCEDURE — 97112 NEUROMUSCULAR REEDUCATION: CPT

## 2024-09-23 NOTE — PROGRESS NOTES
"[facility]   Physical Therapy Treatment Note      Name: Dhara Cortes  Clinic Number: [id number]    Therapy Diagnosis:   Encounter Diagnosis   Name Primary?    Chronic bilateral low back pain without sciatica Yes       Physician: No Smith MD    Visit Date: 9/23/2024    Physician Orders: PT Eval and Treat  Medical Diagnosis from Referral: M54.50,G89.29 (ICD-10-CM) - Chronic bilateral low back pain without sciatica  Evaluation Date: 9/10/2024  Authorization Period Expiration: 12/31/2024  Plan of Care Expiration: 11/5/2024  Progress Note Due: 10/8/2024  Visit # / Visits authorized: 3/20 + EVAL   FOTO: 1/3     Precautions: Standard      Time In: 505pm  Time Out: 600pm  Total Billable Time: 52 minutes    Subjective     Patient reports: that she was in the car all weekend so her low back has been bothering her for most the of the weekend and still is today.   She was compliant with home exercise program.  Response to previous treatment: no exacerbation of symptoms  Functional change: no significant change at this time    Pain: 8/10  Location: bilateral low back    Objective      Objective Measures updated at progress report unless specified.     Treatment     Dhara received the treatments listed below:      therapeutic exercises to develop strength, endurance, ROM, and flexibility for 10 minutes including:    Thoracic extension over foam roll: 3 x 8 with 3" holds  Sidelying clams: red theraband, 2x12 ea    manual therapy techniques were applied for 12 minutes, including:    Sidelying lumbar gapping, grade IV-V  Prone thoracic PA mobs, grade III-IV  Long axis hip distraction, grade V  Side lying R SI joint manipulation, grade V    neuromuscular re-education activities to improve: Balance, Coordination, Kinesthetic, Sense, Proprioception, and Posture for 30 minutes. The following activities were included:    Posterior pelvic tilts with biofeedback cuff, 3 x 8 with 5" holds  Posterior " pelvic tilts with biofeedback cuff + bent knee fallout, 2 x 8  Bridges, 3  x8  Paloff press with 10# on cable column, 2 x 12 each side  Lateral stepping: red theraband around ankles, 3 laps x 10 yds    Patient Education and Home Exercises       Education provided:   - Continue prior home exercise program    Written Home Exercises Provided: Pt instructed to continue prior HEP. Exercises were reviewed and Dhara was able to demonstrate them prior to the end of the session.  Dhara demonstrated good  understanding of the education provided. See Electronic Medical Record under Patient Instructions for exercises provided during therapy sessions    Assessment     Dhara presents today with increased symptoms at this time after long driving over the weekend. Her lumbar extension and R sided bending was pain full today. Her side bending was improved with manual interventions but pain with extensions remained. She was progressed with core stabilization and hip strengthening today with good tolerance.     Dhara Is progressing well towards her goals.   Patient prognosis is Good.     Patient will continue to benefit from skilled outpatient physical therapy to address the deficits listed in the problem list box on initial evaluation, provide pt/family education and to maximize pt's level of independence in the home and community environment.     Patient's spiritual, cultural and educational needs considered and pt agreeable to plan of care and goals.     Anticipated barriers to physical therapy: chronicity of condition    Goals:   Short Term Goals (4 Weeks):  1. Patient will report decreased low back pain to </= 6/10 to increase tolerance for activities of daily living. Progressing, not met  2. Patient will increase hip extension range of motion to >/= 10 degrees in order to perform activities of daily living with less difficulty. Progressing, not met  3. Patient will improve impaired lower extremity manual muscle tests by  1/3 grade to improve strength for functional tasks. Progressing, not met  4. Patient will be compliant with home exercise program to supplement therapy in restoring functional mobility. Progressing, not met     Long Term Goals (8 Weeks):   1. Patient will report decreased low back pain to </= 3/10 to increase tolerance for activities of daily living. Progressing, not met  2. Patient will improve impaired lower extremity manual muscle tests to >/=4+/5 to improve strength for functional tasks. Progressing, not met  3. Patient will improve FOTO score to >/= 66% to demonstrate decrease in perceived limitations with functional mobility. Progressing, not met  4. Patient goal: to reduce her discomfort with static positions. Progressing, not met  5. Patient will perform 25# deadlift with good control to demonstrate improved core strength. Progressing, not met    Plan     Plan of care Certification: 9/10/2024 to 11/5/2024.     Outpatient Physical Therapy 2 times weekly for 8 weeks to include the following interventions: Electrical Stimulation , Gait Training, Manual Therapy, Moist Heat/ Ice, Neuromuscular Re-ed, Patient Education, Self Care, Therapeutic Activities, and Therapeutic Exercise.     ELFEGO GIBBONS, PT, DPT

## 2024-09-30 ENCOUNTER — CLINICAL SUPPORT (OUTPATIENT)
Dept: REHABILITATION | Facility: HOSPITAL | Age: 24
End: 2024-09-30
Attending: INTERNAL MEDICINE
Payer: COMMERCIAL

## 2024-09-30 DIAGNOSIS — G89.29 CHRONIC BILATERAL LOW BACK PAIN WITHOUT SCIATICA: Primary | ICD-10-CM

## 2024-09-30 DIAGNOSIS — M54.50 CHRONIC BILATERAL LOW BACK PAIN WITHOUT SCIATICA: Primary | ICD-10-CM

## 2024-09-30 PROCEDURE — 97112 NEUROMUSCULAR REEDUCATION: CPT

## 2024-09-30 PROCEDURE — 97140 MANUAL THERAPY 1/> REGIONS: CPT

## 2024-09-30 NOTE — PROGRESS NOTES
"[facility]   Physical Therapy Treatment Note      Name: Dhara Cortes  Clinic Number: [id number]    Therapy Diagnosis:   Encounter Diagnosis   Name Primary?    Chronic bilateral low back pain without sciatica Yes     Physician: No Smith MD    Visit Date: 9/30/2024    Physician Orders: PT Eval and Treat  Medical Diagnosis from Referral: M54.50,G89.29 (ICD-10-CM) - Chronic bilateral low back pain without sciatica  Evaluation Date: 9/10/2024  Authorization Period Expiration: 12/31/2024  Plan of Care Expiration: 11/5/2024  Progress Note Due: 10/8/2024  Visit # / Visits authorized: 4/20 + EVAL   FOTO: 1/3     Precautions: Standard      Time In: 4:00 pm   Time Out: 5:04 pm   Total Billable Time: 64 minutes    Subjective     Patient reports: That her back is definitely uncomfortable and she is having some pain in her original spot and now a little bit higher.  She typically gets relief from manipulation but hasn't had lasting relief.       She was compliant with home exercise program.  Response to previous treatment: no exacerbation of symptoms  Functional change: no significant change at this time    Pain: 6/10  Location: bilateral low back    Objective      Objective Measures updated at progress report unless specified.     Treatment     Dhara received the treatments listed below:      therapeutic exercises to develop strength, endurance, ROM, and flexibility for 0 minutes including:    Thoracic extension over foam roll: 3 x 8 with 3" holds      manual therapy techniques were applied for 15 minutes, including:    Sidelying Lumbar Gapping, Grade IV-V  Supine Thoracic HVLAT  Long Axis Hip Distraction, Grade V    Not Today:   Side lying R SI joint manipulation, grade V    neuromuscular re-education activities to improve: Balance, Coordination, Kinesthetic, Sense, Proprioception, and Posture for 49 minutes. The following activities were included:    TA Activation with Posterior Pelvic " Tilt and Biofeedback 12 x 10 Sec   TA Activation with Posterior Pelvic Tilit, Biofeedback and Bent Knee Fall Out RTB 2 x 12   Bridges RTB 3 x 8 3 Sec Holds   Sidelying Clams: RTB 2x12 3 Sec Holds   Quadruped Core Activation with Neutral Pelvis and Hip Extension 2 x 12   Supine Knee Elevated TheraBall Press Down 4 x 8 3 Sec Holds   Anti-Flexion Lifts GTB 2 x 12   Anti-Extension Lifts GTB 2 x 12     Not Today:   Paloff press with 10# on cable column, 2 x 12 each side  Lateral stepping: red theraband around ankles, 3 laps x 10 yds    Patient Education and Home Exercises       Education provided:   - Continue prior home exercise program    Written Home Exercises Provided: Pt instructed to continue prior HEP. Exercises were reviewed and Dhara was able to demonstrate them prior to the end of the session.  Dhara demonstrated good  understanding of the education provided. See Electronic Medical Record under Patient Instructions for exercises provided during therapy sessions    Assessment     Dhara presents today with continued low back pain and pain with movements and with over-pressure into end range, but no pain with static positions unless held for prolonged times.  She continues to have a significant hinge point through general hypermobility present in the spine.  She was educated on how her inherent hypermobility will affect her plan of care and overall prognosis and the importance of building stability and muscle control.  Today focused on improving patient's core activation and improving control on neutral spine positioning.  She had difficulty maintaining neutral spine posture due to her naturally increased lordosis and anterior pelvic tilt.  She completed multiple interventions with good tolerance but difficulty and significant fatigue present to address core stability.  She additionally worked on improving lumbar and hip interdependence with focus on improving glut and hip strengthening for offloading of the  lumbar spine.  She will continue to work to improve lumbar/core stability in progressed position and continue to address glut and hip strengthening deficits and will be progressed as appropriate.      Dhara Is progressing well towards her goals.   Patient prognosis is Good.     Patient will continue to benefit from skilled outpatient physical therapy to address the deficits listed in the problem list box on initial evaluation, provide pt/family education and to maximize pt's level of independence in the home and community environment.     Patient's spiritual, cultural and educational needs considered and pt agreeable to plan of care and goals.     Anticipated barriers to physical therapy: chronicity of condition    Goals:   Short Term Goals (4 Weeks):  1. Patient will report decreased low back pain to </= 6/10 to increase tolerance for activities of daily living. Progressing, not met  2. Patient will increase hip extension range of motion to >/= 10 degrees in order to perform activities of daily living with less difficulty. Progressing, not met  3. Patient will improve impaired lower extremity manual muscle tests by 1/3 grade to improve strength for functional tasks. Progressing, not met  4. Patient will be compliant with home exercise program to supplement therapy in restoring functional mobility. Progressing, not met     Long Term Goals (8 Weeks):   1. Patient will report decreased low back pain to </= 3/10 to increase tolerance for activities of daily living. Progressing, not met  2. Patient will improve impaired lower extremity manual muscle tests to >/=4+/5 to improve strength for functional tasks. Progressing, not met  3. Patient will improve FOTO score to >/= 66% to demonstrate decrease in perceived limitations with functional mobility. Progressing, not met  4. Patient goal: to reduce her discomfort with static positions. Progressing, not met  5. Patient will perform 25# deadlift with good control to  demonstrate improved core strength. Progressing, not met    Plan     Plan of care Certification: 9/10/2024 to 11/5/2024.     Outpatient Physical Therapy 2 times weekly for 8 weeks to include the following interventions: Electrical Stimulation , Gait Training, Manual Therapy, Moist Heat/ Ice, Neuromuscular Re-ed, Patient Education, Self Care, Therapeutic Activities, and Therapeutic Exercise.     Anirudh Vera, PT, DPT

## 2024-10-01 NOTE — PROGRESS NOTES
"[facility]   Physical Therapy Treatment Note      Name: Dhara Cortes  Clinic Number: [id number]    Therapy Diagnosis:   Encounter Diagnosis   Name Primary?    Chronic bilateral low back pain without sciatica Yes     Physician: No Smith MD    Visit Date: 10/2/2024    Physician Orders: PT Eval and Treat  Medical Diagnosis from Referral: M54.50,G89.29 (ICD-10-CM) - Chronic bilateral low back pain without sciatica  Evaluation Date: 9/10/2024  Authorization Period Expiration: 12/31/2024  Plan of Care Expiration: 11/5/2024  Progress Note Due: 10/8/2024  Visit # / Visits authorized: 5/20 + EVAL   FOTO: 1/3     Precautions: Standard      Time In: 4:02 pm   Time Out: 5:00 pm   Total Billable Time: 58 minutes    Subjective     Patient reports: That she is a little frustrated and confused because every time she comes to PT she ends up feeling better but by the time she is home, she has the same pain in the low back.  She does feel better during the second visit of the week because she has been active at work, but it still doesn't last.      She was compliant with home exercise program.  Response to previous treatment: no exacerbation of symptoms  Functional change: no significant change at this time    Pain: 6/10  Location: bilateral low back    Objective      Objective Measures updated at progress report unless specified.     Treatment     Dhara received the treatments listed below:      therapeutic exercises to develop strength, endurance, ROM, and flexibility for 3 minutes including:    Side Lying Open Books 1 x 15 Each Side     Not Today:   Thoracic extension over foam roll: 3 x 8 with 3" holds    manual therapy techniques were applied for 17 minutes, including:    Sidelying Lumbar Gapping, Grade IV-V  Supine Thoracic HVLAT  Long Axis Hip Distraction, Grade V  Passive Hip Rotation Range of Motion   SI Joint "Shotgun" MET     Not Today:   Side lying R SI joint manipulation, grade " V    neuromuscular re-education activities to improve: Balance, Coordination, Kinesthetic, Sense, Proprioception, and Posture for 38 minutes. The following activities were included:    TA Activation with Posterior Pelvic Tilt and Biofeedback 10 x 10 Sec   TA Activation with Posterior Pelvic Tilit, Biofeedback and Bent Knee Fall Out RTB 2 x 12   Bridges RTB with Blue Sports Cord Pull Downs 3 x 8 3 Sec Holds  Paloff Press and Lift with 10# on Cable Column 2 x 12  Sanchez Carries 15# 3 Laps x 10 Yards Each   Overhead Theraball Presses into Wall 15 x 5 Sec (Cue for Core Contraction and Neutral Spine)    Anti-Extension Lifts GTB 2 x 12     Not Today:   Sidelying Clams: RTB 2x12 3 Sec Holds   Quadruped Core Activation with Neutral Pelvis and Hip Extension 2 x 12   Supine Knee Elevated TheraBall Press Down 4 x 8 3 Sec Holds   Anti-Flexion Lifts GTB 2 x 12   Lateral stepping: red theraband around ankles, 3 laps x 10 yds    Patient Education and Home Exercises       Education provided:   - Continue prior home exercise program    Written Home Exercises Provided: Pt instructed to continue prior HEP. Exercises were reviewed and Dhara was able to demonstrate them prior to the end of the session.  Dhara demonstrated good  understanding of the education provided. See Electronic Medical Record under Patient Instructions for exercises provided during therapy sessions    Assessment     Dhara presents today with similar presentation to previous visit with continued hypermobility related low back pain also due in part to excessive anterior pelvic tilt and hyperlordosis of the lumbar spine.  Patient continues to get pain relieving benefits from manual interventions to address thoracic spine and neural modulation.  She continued to receive education on the importance of stability training and control to address underlying hypermobility.  She required consistent cueing for pelvic rotation and neutral spine positioning and has  difficulty maintaining neutral spine posture.  She continued to work to improve core stability and control with neutral spine and various interventions.  She also continued to work to improve hip and glut strengthening for offloading and support of the lumbar spine.  She progressed with her palloff press to include a lift which was very challenging but provided good stimulus to challenge the core.  She will continue to work to improve lumbar/core stability in progressed position and continue to address glut and hip strengthening deficits and will be progressed as appropriate.      Dhara Is progressing well towards her goals.   Patient prognosis is Good.     Patient will continue to benefit from skilled outpatient physical therapy to address the deficits listed in the problem list box on initial evaluation, provide pt/family education and to maximize pt's level of independence in the home and community environment.     Patient's spiritual, cultural and educational needs considered and pt agreeable to plan of care and goals.     Anticipated barriers to physical therapy: chronicity of condition    Goals:   Short Term Goals (4 Weeks):  1. Patient will report decreased low back pain to </= 6/10 to increase tolerance for activities of daily living. Progressing, not met  2. Patient will increase hip extension range of motion to >/= 10 degrees in order to perform activities of daily living with less difficulty. Progressing, not met  3. Patient will improve impaired lower extremity manual muscle tests by 1/3 grade to improve strength for functional tasks. Progressing, not met  4. Patient will be compliant with home exercise program to supplement therapy in restoring functional mobility. Progressing, not met     Long Term Goals (8 Weeks):   1. Patient will report decreased low back pain to </= 3/10 to increase tolerance for activities of daily living. Progressing, not met  2. Patient will improve impaired lower extremity  manual muscle tests to >/=4+/5 to improve strength for functional tasks. Progressing, not met  3. Patient will improve FOTO score to >/= 66% to demonstrate decrease in perceived limitations with functional mobility. Progressing, not met  4. Patient goal: to reduce her discomfort with static positions. Progressing, not met  5. Patient will perform 25# deadlift with good control to demonstrate improved core strength. Progressing, not met    Plan     Plan of care Certification: 9/10/2024 to 11/5/2024.     Outpatient Physical Therapy 2 times weekly for 8 weeks to include the following interventions: Electrical Stimulation , Gait Training, Manual Therapy, Moist Heat/ Ice, Neuromuscular Re-ed, Patient Education, Self Care, Therapeutic Activities, and Therapeutic Exercise.     Anirudh Vera, PT, DPT

## 2024-10-02 ENCOUNTER — CLINICAL SUPPORT (OUTPATIENT)
Dept: REHABILITATION | Facility: HOSPITAL | Age: 24
End: 2024-10-02
Payer: COMMERCIAL

## 2024-10-02 DIAGNOSIS — G89.29 CHRONIC BILATERAL LOW BACK PAIN WITHOUT SCIATICA: Primary | ICD-10-CM

## 2024-10-02 DIAGNOSIS — M54.50 CHRONIC BILATERAL LOW BACK PAIN WITHOUT SCIATICA: Primary | ICD-10-CM

## 2024-10-02 PROCEDURE — 97112 NEUROMUSCULAR REEDUCATION: CPT

## 2024-10-02 PROCEDURE — 97140 MANUAL THERAPY 1/> REGIONS: CPT

## 2024-10-08 NOTE — PROGRESS NOTES
"[facility]   Physical Therapy Treatment Note      Name: Dhara Cortes  Clinic Number: [id number]    Therapy Diagnosis:   Encounter Diagnosis   Name Primary?    Chronic bilateral low back pain without sciatica Yes     Physician: No Smith MD    Visit Date: 10/9/2024    Physician Orders: PT Eval and Treat  Medical Diagnosis from Referral: M54.50,G89.29 (ICD-10-CM) - Chronic bilateral low back pain without sciatica  Evaluation Date: 9/10/2024  Authorization Period Expiration: 12/31/2024  Plan of Care Expiration: 11/5/2024  Progress Note Due: 10/8/2024  Visit # / Visits authorized: 6/20 + EVAL   FOTO: 1/3     Precautions: Standard      Time In: 4:03 pm   Time Out: 5:13 pm   Total Billable Time: 70 minutes    Subjective     Patient reports: That she felt pretty good after last PT session, but over the weekend when she wasn't doing as much and was more sedentary she started to have a lot of pain by Sunday night and Monday which is why she cancelled.  She is very frustrated about her back pain and it is making working out more difficult which makes the frustration even worse.      She was compliant with home exercise program.  Response to previous treatment: no exacerbation of symptoms  Functional change: no significant change at this time    Pain: 6/10  Location: bilateral low back    Objective      Objective Measures updated at progress report unless specified.     Treatment     Dhara received the treatments listed below:      therapeutic exercises to develop strength, endurance, ROM, and flexibility for 19 minutes including:    Side Lying Open Books 1 x 15 RTB Each Side   Patient Education     Not Today:   Thoracic extension over foam roll: 3 x 8 with 3" holds    manual therapy techniques were applied for 8 minutes, including:    Sidelying Lumbar Gapping, Grade IV-V  Supine Thoracic HVLAT  Long Axis Hip Distraction, Grade V  Manual Lumbar Distraction     Not Today:   Passive Hip " "Rotation Range of Motion   SI Joint "Shotgun" MET   Side lying R SI joint manipulation, grade V    neuromuscular re-education activities to improve: Balance, Coordination, Kinesthetic, Sense, Proprioception, and Posture for 43 minutes. The following activities were included:    TA Activation with Posterior Pelvic Tilt and Biofeedback 10 x 10 Sec   TA Activation with Posterior Pelvic Tilit, Biofeedback and Bent Knee Fall Out RTB 2 x 12   TA Activation with Posterior Pelvic Tilt, Biofeedback and Heel Slides   Bridges RTB 3 x 8 3 Sec Holds  Sidelying Clams: RTB 2x12 3 Sec Holds   Supine Knee Elevated TheraBall Press Down 4 x 8 3 Sec Holds   Bent Over Hip Extension 3 x 10     Not Today:   Paloff Press and Lift with 10# on Cable Column 2 x 12  Sanchez Carries 15# 3 Laps x 10 Yards Each   Overhead Theraball Presses into Wall 15 x 5 Sec (Cue for Core Contraction and Neutral Spine)    Anti-Extension Lifts GTB 2 x 12   Quadruped Core Activation with Neutral Pelvis and Hip Extension 2 x 12   Anti-Flexion Lifts GTB 2 x 12   Lateral stepping: red theraband around ankles, 3 laps x 10 yds    Patient Education and Home Exercises       Education provided:   - Continue prior home exercise program    Written Home Exercises Provided: Pt instructed to continue prior HEP. Exercises were reviewed and Dhara was able to demonstrate them prior to the end of the session.  Dhara demonstrated good  understanding of the education provided. See Electronic Medical Record under Patient Instructions for exercises provided during therapy sessions    Assessment     Dhara presents today with continued presentation as she continues to have increases in pain with inactivity.  She continues to have great relief with manual interventions to modulate the nervous system and decrease any additional restrictions of the thoracic spine and hips.  She continues to show immediate benefits from activation of core stabilizers and control of pelvic tilting.  " Patient received ample education today on managing her symptoms along with life stresses and how the course of treatment typically progressed and how her specific body alters course of treatment.  She continued to work to improve lumbar and hip interdependence and core stability with good cueing for pelvic positioning.  She continues to be reinforced with focusing on improving stabilization and core control.  She will continue to work to improve lumbar/core stability in progressed position and continue to address glut and hip strengthening deficits and will be progressed as appropriate.      Dhara Is progressing well towards her goals.   Patient prognosis is Good.     Patient will continue to benefit from skilled outpatient physical therapy to address the deficits listed in the problem list box on initial evaluation, provide pt/family education and to maximize pt's level of independence in the home and community environment.     Patient's spiritual, cultural and educational needs considered and pt agreeable to plan of care and goals.     Anticipated barriers to physical therapy: chronicity of condition    Goals:   Short Term Goals (4 Weeks):  1. Patient will report decreased low back pain to </= 6/10 to increase tolerance for activities of daily living. Progressing, not met  2. Patient will increase hip extension range of motion to >/= 10 degrees in order to perform activities of daily living with less difficulty. Progressing, not met  3. Patient will improve impaired lower extremity manual muscle tests by 1/3 grade to improve strength for functional tasks. Progressing, not met  4. Patient will be compliant with home exercise program to supplement therapy in restoring functional mobility. Progressing, not met     Long Term Goals (8 Weeks):   1. Patient will report decreased low back pain to </= 3/10 to increase tolerance for activities of daily living. Progressing, not met  2. Patient will improve impaired lower  extremity manual muscle tests to >/=4+/5 to improve strength for functional tasks. Progressing, not met  3. Patient will improve FOTO score to >/= 66% to demonstrate decrease in perceived limitations with functional mobility. Progressing, not met  4. Patient goal: to reduce her discomfort with static positions. Progressing, not met  5. Patient will perform 25# deadlift with good control to demonstrate improved core strength. Progressing, not met    Plan     Plan of care Certification: 9/10/2024 to 11/5/2024.     Outpatient Physical Therapy 2 times weekly for 8 weeks to include the following interventions: Electrical Stimulation , Gait Training, Manual Therapy, Moist Heat/ Ice, Neuromuscular Re-ed, Patient Education, Self Care, Therapeutic Activities, and Therapeutic Exercise.     Anirudh Vera, PT, DPT

## 2024-10-09 ENCOUNTER — CLINICAL SUPPORT (OUTPATIENT)
Dept: REHABILITATION | Facility: HOSPITAL | Age: 24
End: 2024-10-09
Payer: COMMERCIAL

## 2024-10-09 DIAGNOSIS — M54.50 CHRONIC BILATERAL LOW BACK PAIN WITHOUT SCIATICA: Primary | ICD-10-CM

## 2024-10-09 DIAGNOSIS — G89.29 CHRONIC BILATERAL LOW BACK PAIN WITHOUT SCIATICA: Primary | ICD-10-CM

## 2024-10-09 PROCEDURE — 97112 NEUROMUSCULAR REEDUCATION: CPT

## 2024-10-09 PROCEDURE — 97110 THERAPEUTIC EXERCISES: CPT

## 2024-10-09 PROCEDURE — 97140 MANUAL THERAPY 1/> REGIONS: CPT

## 2024-10-13 ENCOUNTER — PATIENT MESSAGE (OUTPATIENT)
Dept: INTERNAL MEDICINE | Facility: CLINIC | Age: 24
End: 2024-10-13
Payer: COMMERCIAL

## 2024-10-14 ENCOUNTER — CLINICAL SUPPORT (OUTPATIENT)
Dept: REHABILITATION | Facility: HOSPITAL | Age: 24
End: 2024-10-14
Payer: COMMERCIAL

## 2024-10-14 DIAGNOSIS — M54.50 CHRONIC BILATERAL LOW BACK PAIN WITHOUT SCIATICA: Primary | ICD-10-CM

## 2024-10-14 DIAGNOSIS — G89.29 CHRONIC BILATERAL LOW BACK PAIN WITHOUT SCIATICA: Primary | ICD-10-CM

## 2024-10-14 PROCEDURE — 97112 NEUROMUSCULAR REEDUCATION: CPT

## 2024-10-14 NOTE — PROGRESS NOTES
"[facility]   Physical Therapy Treatment Note      Name: Dhara Cortes  Clinic Number: [id number]    Therapy Diagnosis:   No diagnosis found.    Physician: No Smith MD    Visit Date: 10/14/2024    Physician Orders: PT Eval and Treat  Medical Diagnosis from Referral: M54.50,G89.29 (ICD-10-CM) - Chronic bilateral low back pain without sciatica  Evaluation Date: 9/10/2024  Authorization Period Expiration: 12/31/2024  Plan of Care Expiration: 11/5/2024  Progress Note Due: 10/8/2024  Visit # / Visits authorized: 7/20 + EVAL   FOTO: 1/3     Precautions: Standard      Time In: 3:07 pm   Time Out: 4:05 pm   Total Billable Time: 58 minutes    Subjective     Patient reports: That she did a lot over the weekend and felt good while she was doing it and then when she stopped she was in a ton of pain.  She started noticing that when she adjusts her back to have a more neutral spine she gets a tingling sensation in the legs that she has never had before and she is concerned by it.      She was compliant with home exercise program.  Response to previous treatment: no exacerbation of symptoms  Functional change: no significant change at this time    Pain: 6/10  Location: bilateral low back    Objective      Objective Measures updated at progress report unless specified.     Treatment     Dhara received the treatments listed below:      therapeutic exercises to develop strength, endurance, ROM, and flexibility for 0 minutes including:    Patient Education     Not Today:   Side Lying Open Books 1 x 15 RTB Each Side   Thoracic extension over foam roll: 3 x 8 with 3" holds    manual therapy techniques were applied for 2 minutes, including:    Supine Thoracic HVLAT    Not Today:   Sidelying Lumbar Gapping, Grade IV-V  Long Axis Hip Distraction, Grade V  Manual Lumbar Distraction   Passive Hip Rotation Range of Motion   SI Joint "Shotgun" MET   Side lying R SI joint manipulation, grade " V    neuromuscular re-education activities to improve: Balance, Coordination, Kinesthetic, Sense, Proprioception, and Posture for 56 minutes. The following activities were included:    TA Activation with Posterior Pelvic Tilt and Biofeedback 12 x 10 Sec   TA Activation with Posterior Pelvic Tilit, Biofeedback and Bent Knee Fall Out RTB 1 x 12   TA Activation with Posterior Pelvic Tilt on Wall 1 x 8   Paloff Press and Lift with 10# on Cable Column 2 x 10  Pull Downs with Core Engagement 3 x 8   Anti-Flexion Lifts GTB 2 x 12  Anti-Extension Lifts GTB 2 x 12   Bent Over Hip Extension 2 x 15   Functional Movement Pattern Flexion 1 x 5     Not Today:   TA Activation with Posterior Pelvic Tilt, Biofeedback and Heel Slides   Bridges RTB 3 x 8 3 Sec Holds  Sidelying Clams: RTB 2x12 3 Sec Holds   Supine Knee Elevated TheraBall Press Down 4 x 8 3 Sec Holds   Sanchez Carries 15# 3 Laps x 10 Yards Each   Overhead Theraball Presses into Wall 15 x 5 Sec (Cue for Core Contraction and Neutral Spine)    Quadruped Core Activation with Neutral Pelvis and Hip Extension 2 x 12   Lateral stepping: red theraband around ankles, 3 laps x 10 yds    Patient Education and Home Exercises       Education provided:   - Continue prior home exercise program    Written Home Exercises Provided: Pt instructed to continue prior HEP. Exercises were reviewed and Dhara was able to demonstrate them prior to the end of the session.  Dhara demonstrated good  understanding of the education provided. See Electronic Medical Record under Patient Instructions for exercises provided during therapy sessions    Assessment     Dhara presents today with continued irritability and pain in the lumbar spine with a reports of new onset of tingling in the lower extremities bilaterally when she is working into a neutral spine position.  Patient worked to improve core stability but in the supine position when she worked on her posterior pelvic tilt she would experience  some tingling to her feet when completely supine and in hooklying just present in the knees.  At this time it is believed that she has had some chronic compression of nerves that she is now able to offload and the sensation is returning.  She was educated on the anatomy and physiology of the spine and nerve roots.  She continued to work on improving core stability with a more neutral spine and additionally worked to improve glut and hip activation without compensatory lumbar motion and was able to finish the session with no tingling present.  She continues to be reinforced with focusing on improving stabilization and core control.  She will continue to work to improve lumbar/core stability in progressed position and continue to address glut and hip strengthening deficits and will be progressed as appropriate.      Dhara Is progressing well towards her goals.   Patient prognosis is Good.     Patient will continue to benefit from skilled outpatient physical therapy to address the deficits listed in the problem list box on initial evaluation, provide pt/family education and to maximize pt's level of independence in the home and community environment.     Patient's spiritual, cultural and educational needs considered and pt agreeable to plan of care and goals.     Anticipated barriers to physical therapy: chronicity of condition    Goals:   Short Term Goals (4 Weeks):  1. Patient will report decreased low back pain to </= 6/10 to increase tolerance for activities of daily living. Progressing, not met  2. Patient will increase hip extension range of motion to >/= 10 degrees in order to perform activities of daily living with less difficulty. Progressing, not met  3. Patient will improve impaired lower extremity manual muscle tests by 1/3 grade to improve strength for functional tasks. Progressing, not met  4. Patient will be compliant with home exercise program to supplement therapy in restoring functional mobility.  Progressing, not met     Long Term Goals (8 Weeks):   1. Patient will report decreased low back pain to </= 3/10 to increase tolerance for activities of daily living. Progressing, not met  2. Patient will improve impaired lower extremity manual muscle tests to >/=4+/5 to improve strength for functional tasks. Progressing, not met  3. Patient will improve FOTO score to >/= 66% to demonstrate decrease in perceived limitations with functional mobility. Progressing, not met  4. Patient goal: to reduce her discomfort with static positions. Progressing, not met  5. Patient will perform 25# deadlift with good control to demonstrate improved core strength. Progressing, not met    Plan     Plan of care Certification: 9/10/2024 to 11/5/2024.     Outpatient Physical Therapy 2 times weekly for 8 weeks to include the following interventions: Electrical Stimulation , Gait Training, Manual Therapy, Moist Heat/ Ice, Neuromuscular Re-ed, Patient Education, Self Care, Therapeutic Activities, and Therapeutic Exercise.     Anirudh Vera, PT, DPT

## 2024-10-21 ENCOUNTER — CLINICAL SUPPORT (OUTPATIENT)
Dept: REHABILITATION | Facility: HOSPITAL | Age: 24
End: 2024-10-21
Payer: COMMERCIAL

## 2024-10-21 DIAGNOSIS — G89.29 CHRONIC BILATERAL LOW BACK PAIN WITHOUT SCIATICA: Primary | ICD-10-CM

## 2024-10-21 DIAGNOSIS — M54.50 CHRONIC BILATERAL LOW BACK PAIN WITHOUT SCIATICA: Primary | ICD-10-CM

## 2024-10-21 PROCEDURE — 97140 MANUAL THERAPY 1/> REGIONS: CPT

## 2024-10-21 PROCEDURE — 97112 NEUROMUSCULAR REEDUCATION: CPT

## 2024-10-21 NOTE — PROGRESS NOTES
"[facility]   Physical Therapy Treatment Note      Name: Dhara Cortes  Clinic Number: [id number]    Therapy Diagnosis:   Encounter Diagnosis   Name Primary?    Chronic bilateral low back pain without sciatica Yes     Physician: No Smith MD    Visit Date: 10/21/2024    Physician Orders: PT Eval and Treat  Medical Diagnosis from Referral: M54.50,G89.29 (ICD-10-CM) - Chronic bilateral low back pain without sciatica  Evaluation Date: 9/10/2024  Authorization Period Expiration: 12/31/2024  Plan of Care Expiration: 11/5/2024  Progress Note Due: 10/8/2024  Visit # / Visits authorized: 8/20 + EVAL   FOTO: 1/3     Precautions: Standard      Time In: 3:06 pm   Time Out: 4:01 pm   Total Billable Time: 55 minutes    Subjective     Patient reports: That she is happy to report that she isn't having low back pain and she has been doing her exercises at home and thinks its helping, but she is noticing some increased pain in the mid-back and she isn't sure why.  She is only occasionally having some tingling but it isn't too bad and is much better.      She was compliant with home exercise program.  Response to previous treatment: no exacerbation of symptoms  Functional change: no significant change at this time    Pain: 6/10  Location: bilateral low back    Objective      Objective Measures updated at progress report unless specified.     Treatment     Dhara received the treatments listed below:      therapeutic exercises to develop strength, endurance, ROM, and flexibility for 5 minutes including:    Patient Education   Side Lying Open Books 1 x 15 GTB Each Side     Not Today:   Thoracic extension over foam roll: 3 x 8 with 3" holds    manual therapy techniques were applied for 11 minutes, including:    Supine Thoracic HVLAT  Long Axis Hip Distraction, Grade V  Passive Hip Rotation Range of Motion   SI Joint "Shotgun" MET   SI Joint Anterior Rotation MET on Right     Not Today:   Sidelying " Lumbar Gapping, Grade IV-V  Manual Lumbar Distraction   Side lying R SI joint manipulation, grade V    neuromuscular re-education activities to improve: Balance, Coordination, Kinesthetic, Sense, Proprioception, and Posture for 39 minutes. The following activities were included:    TA Activation with Posterior Pelvic Tilt and Biofeedback 12 x 10 Sec   TA Activation with Posterior Pelvic Tilit, Biofeedback and Bent Knee Fall Out RTB 1 x 12   TA Activation with Posterior Pelvic Tilt, Biofeedback and Heel Slides  1 x 12   TA Activation with Posterior Pelvic Tilt on Wall 1 x 8   Bridges GTB 3 x 8 3 Sec Holds  Sidelying Clams: GTB 2x12 3 Sec Holds  Serratus Robots 2 x 12   Paloff Press and Lift with 10# on Cable Column 2 x 10    Not Today:   Pull Downs with Core Engagement 3 x 8   Anti-Flexion Lifts GTB 2 x 12  Anti-Extension Lifts GTB 2 x 12   Bent Over Hip Extension 2 x 15   Functional Movement Pattern Flexion 1 x 5  Supine Knee Elevated TheraBall Press Down 4 x 8 3 Sec Holds   Sanchez Carries 15# 3 Laps x 10 Yards Each   Overhead Theraball Presses into Wall 15 x 5 Sec (Cue for Core Contraction and Neutral Spine)    Quadruped Core Activation with Neutral Pelvis and Hip Extension 2 x 12   Lateral stepping: red theraband around ankles, 3 laps x 10 yds    Patient Education and Home Exercises       Education provided:   - Continue prior home exercise program    Written Home Exercises Provided: Pt instructed to continue prior HEP. Exercises were reviewed and Dhara was able to demonstrate them prior to the end of the session.  Dhara demonstrated good  understanding of the education provided. See Electronic Medical Record under Patient Instructions for exercises provided during therapy sessions    Assessment     Dhara presents today with decreased pain and irritability in the lumbar spine for the first time.  She did report improved compliance of HEP which has led to improved symptoms.  During interventions today she  felt a click and had acute pain in the low back around the SI joint.  Following SI joint mobilization and manipulation, she no longer had pain in the area.  She continued to work to improve lumbar/core stability in a neutral position and is showing good improvements with static control, but continues to have some additional difficulty with hip interdependence.  She is showing overall good progress with stability interventions and has seen the benefit of continuing interventions following compliance.   She continues to need to build strengthening, stability, and functional control of the lumbar spine with mobility and isolated strengthening and requires cueing for form.  Her underlying hypermobility does lengthen the expected plan of care and is factored into the progress seen.  She will continue to work to improve lumbar/core stability in progressed position and continue to address glut and hip strengthening deficits and will be progressed as appropriate.      Dhara Is progressing well towards her goals.   Patient prognosis is Good.     Patient will continue to benefit from skilled outpatient physical therapy to address the deficits listed in the problem list box on initial evaluation, provide pt/family education and to maximize pt's level of independence in the home and community environment.     Patient's spiritual, cultural and educational needs considered and pt agreeable to plan of care and goals.     Anticipated barriers to physical therapy: chronicity of condition    Goals:   Short Term Goals (4 Weeks):  1. Patient will report decreased low back pain to </= 6/10 to increase tolerance for activities of daily living. Progressing, not met  2. Patient will increase hip extension range of motion to >/= 10 degrees in order to perform activities of daily living with less difficulty. Progressing, not met  3. Patient will improve impaired lower extremity manual muscle tests by 1/3 grade to improve strength for  functional tasks. Progressing, not met  4. Patient will be compliant with home exercise program to supplement therapy in restoring functional mobility. Progressing, not met     Long Term Goals (8 Weeks):   1. Patient will report decreased low back pain to </= 3/10 to increase tolerance for activities of daily living. Progressing, not met  2. Patient will improve impaired lower extremity manual muscle tests to >/=4+/5 to improve strength for functional tasks. Progressing, not met  3. Patient will improve FOTO score to >/= 66% to demonstrate decrease in perceived limitations with functional mobility. Progressing, not met  4. Patient goal: to reduce her discomfort with static positions. Progressing, not met  5. Patient will perform 25# deadlift with good control to demonstrate improved core strength. Progressing, not met    Plan     Plan of care Certification: 9/10/2024 to 11/5/2024.     Outpatient Physical Therapy 2 times weekly for 8 weeks to include the following interventions: Electrical Stimulation , Gait Training, Manual Therapy, Moist Heat/ Ice, Neuromuscular Re-ed, Patient Education, Self Care, Therapeutic Activities, and Therapeutic Exercise.     Anirudh Vera, PT, DPT

## 2024-11-06 ENCOUNTER — CLINICAL SUPPORT (OUTPATIENT)
Dept: REHABILITATION | Facility: HOSPITAL | Age: 24
End: 2024-11-06
Payer: COMMERCIAL

## 2024-11-06 DIAGNOSIS — G89.29 CHRONIC BILATERAL LOW BACK PAIN WITHOUT SCIATICA: Primary | ICD-10-CM

## 2024-11-06 DIAGNOSIS — M54.50 CHRONIC BILATERAL LOW BACK PAIN WITHOUT SCIATICA: Primary | ICD-10-CM

## 2024-11-06 PROCEDURE — 97112 NEUROMUSCULAR REEDUCATION: CPT

## 2024-11-06 PROCEDURE — 97110 THERAPEUTIC EXERCISES: CPT

## 2024-11-06 NOTE — PROGRESS NOTES
"[facility]   Physical Therapy Treatment Note      Name: Dhara Cortes  Clinic Number: [id number]    Therapy Diagnosis:   Encounter Diagnosis   Name Primary?    Chronic bilateral low back pain without sciatica Yes     Physician: No Smith MD    Visit Date: 11/6/2024    Physician Orders: PT Eval and Treat  Medical Diagnosis from Referral: M54.50,G89.29 (ICD-10-CM) - Chronic bilateral low back pain without sciatica  Evaluation Date: 9/10/2024  Authorization Period Expiration: 12/31/2024  Plan of Care Expiration: 12/6/2024  Progress Note Due: 12/6/2024  Visit # / Visits authorized: 9/20 + EVAL   FOTO: 1/3     Precautions: Standard      Time In: 4:03 pm   Time Out: 5:10 pm   Total Billable Time: 67 minutes    Subjective     Patient reports: That she hasn't been coming to therapy because she has been in so much pain and she has been extremely down and frustrated on her presentation.  She has been back at school but the pain has been so intense that it makes her hard for her to finish her assignments.  She is fearful because she doesn't know what her options are.      She was compliant with home exercise program.  Response to previous treatment: no exacerbation of symptoms  Functional change: no significant change at this time    Pain: 6/10  Location: bilateral low back    Objective      Objective Measures updated at progress report unless specified.     Treatment     Dhara received the treatments listed below:      therapeutic exercises to develop strength, endurance, ROM, and flexibility for 41 minutes including:    Patient Education   Side Lying Open Books 1 x 15 GTB Each Side     Not Today:   Thoracic extension over foam roll: 3 x 8 with 3" holds    manual therapy techniques were applied for 2 minutes, including:    Supine Thoracic HVLAT    Not Today:   Long Axis Hip Distraction, Grade V  Passive Hip Rotation Range of Motion   SI Joint "Shotgun" MET   SI Joint Anterior Rotation " MET on Right   Sidelying Lumbar Gapping, Grade IV-V  Manual Lumbar Distraction   Side lying R SI joint manipulation, grade V    neuromuscular re-education activities to improve: Balance, Coordination, Kinesthetic, Sense, Proprioception, and Posture for 24 minutes. The following activities were included:    TA Activation with Posterior Pelvic Tilt and Biofeedback 12 x 10 Sec   TA Activation Bridges on TheraBall 3 x 8 2 Sec Holds   Hand Heel Rock Backs with Low Back Arch 2 x 12     Not Today:   TA Activation with Posterior Pelvic Tilit, Biofeedback and Bent Knee Fall Out RTB 1 x 12   TA Activation with Posterior Pelvic Tilt, Biofeedback and Heel Slides  1 x 12   TA Activation with Posterior Pelvic Tilt on Wall 1 x 8   Sidelying Clams: GTB 2x12 3 Sec Holds  Serratus Robots 2 x 12   Paloff Press and Lift with 10# on Cable Column 2 x 10  Pull Downs with Core Engagement 3 x 8   Anti-Flexion Lifts GTB 2 x 12  Anti-Extension Lifts GTB 2 x 12   Bent Over Hip Extension 2 x 15   Functional Movement Pattern Flexion 1 x 5  Supine Knee Elevated TheraBall Press Down 4 x 8 3 Sec Holds   Sanchez Carries 15# 3 Laps x 10 Yards Each   Overhead Theraball Presses into Wall 15 x 5 Sec (Cue for Core Contraction and Neutral Spine)    Quadruped Core Activation with Neutral Pelvis and Hip Extension 2 x 12   Lateral stepping: red theraband around ankles, 3 laps x 10 yds    Patient Education and Home Exercises       Education provided:   - Continue prior home exercise program    Written Home Exercises Provided: Pt instructed to continue prior HEP. Exercises were reviewed and Dahra was able to demonstrate them prior to the end of the session.  Dhara demonstrated good  understanding of the education provided. See Electronic Medical Record under Patient Instructions for exercises provided during therapy sessions    Assessment     Dhara presents today with high irritability in the low back and had significant difficulty finding positions of  comfort that would modulate the symptoms.   We spent a majority of the session discussing options and educating on pain science as well as addressing patient's fears and catastrophizing behaviors as they are feeding into additional mental and physical stress.  We discussed different modification patient can use to be able to get through work and school assignments but how she really needs to be focused on maintaining good core stability exercises.  Time was taken to find interventions that were either non-provocative or improving her symptoms and worked to address stability with a more neutral spine posture and improve ability to activate into rounded spine postures.  We discussed options of communication the doctor and she decided that she would really like to commit to physical therapy and will do more of the exercises to address her continued hypermobility and irritability.  Her underlying hypermobility does lengthen the expected plan of care and is factored into the progress seen.  She will continue to work to improve lumbar/core stability in progressed position and continue to address glut and hip strengthening deficits and will be progressed as appropriate.      Dhara Is progressing well towards her goals.   Patient prognosis is Good.     Patient will continue to benefit from skilled outpatient physical therapy to address the deficits listed in the problem list box on initial evaluation, provide pt/family education and to maximize pt's level of independence in the home and community environment.     Patient's spiritual, cultural and educational needs considered and pt agreeable to plan of care and goals.     Anticipated barriers to physical therapy: chronicity of condition    Goals:   Short Term Goals (4 Weeks):  1. Patient will report decreased low back pain to </= 6/10 to increase tolerance for activities of daily living. Progressing, not met  2. Patient will increase hip extension range of motion to >/=  10 degrees in order to perform activities of daily living with less difficulty. Progressing, not met  3. Patient will improve impaired lower extremity manual muscle tests by 1/3 grade to improve strength for functional tasks. Progressing, not met  4. Patient will be compliant with home exercise program to supplement therapy in restoring functional mobility. Progressing, not met     Long Term Goals (8 Weeks):   1. Patient will report decreased low back pain to </= 3/10 to increase tolerance for activities of daily living. Progressing, not met  2. Patient will improve impaired lower extremity manual muscle tests to >/=4+/5 to improve strength for functional tasks. Progressing, not met  3. Patient will improve FOTO score to >/= 66% to demonstrate decrease in perceived limitations with functional mobility. Progressing, not met  4. Patient goal: to reduce her discomfort with static positions. Progressing, not met  5. Patient will perform 25# deadlift with good control to demonstrate improved core strength. Progressing, not met    Plan     Plan of care Certification: 9/10/2024 to 11/5/2024.     Outpatient Physical Therapy 2 times weekly for 8 weeks to include the following interventions: Electrical Stimulation , Gait Training, Manual Therapy, Moist Heat/ Ice, Neuromuscular Re-ed, Patient Education, Self Care, Therapeutic Activities, and Therapeutic Exercise.     Anirudh Vera, PT, DPT

## 2024-11-08 ENCOUNTER — OFFICE VISIT (OUTPATIENT)
Dept: INTERNAL MEDICINE | Facility: CLINIC | Age: 24
End: 2024-11-08
Payer: COMMERCIAL

## 2024-11-08 VITALS
HEART RATE: 64 BPM | OXYGEN SATURATION: 97 % | DIASTOLIC BLOOD PRESSURE: 60 MMHG | WEIGHT: 235.25 LBS | SYSTOLIC BLOOD PRESSURE: 124 MMHG | HEIGHT: 60 IN | BODY MASS INDEX: 46.19 KG/M2

## 2024-11-08 DIAGNOSIS — F41.1 GENERALIZED ANXIETY DISORDER: ICD-10-CM

## 2024-11-08 DIAGNOSIS — H10.31 ACUTE BACTERIAL CONJUNCTIVITIS OF RIGHT EYE: Primary | ICD-10-CM

## 2024-11-08 PROCEDURE — 99999 PR PBB SHADOW E&M-EST. PATIENT-LVL III: CPT | Mod: PBBFAC,,, | Performed by: INTERNAL MEDICINE

## 2024-11-08 PROCEDURE — 3074F SYST BP LT 130 MM HG: CPT | Mod: CPTII,S$GLB,, | Performed by: INTERNAL MEDICINE

## 2024-11-08 PROCEDURE — 1159F MED LIST DOCD IN RCRD: CPT | Mod: CPTII,S$GLB,, | Performed by: INTERNAL MEDICINE

## 2024-11-08 PROCEDURE — 99213 OFFICE O/P EST LOW 20 MIN: CPT | Mod: S$GLB,,, | Performed by: INTERNAL MEDICINE

## 2024-11-08 PROCEDURE — 3008F BODY MASS INDEX DOCD: CPT | Mod: CPTII,S$GLB,, | Performed by: INTERNAL MEDICINE

## 2024-11-08 PROCEDURE — 3078F DIAST BP <80 MM HG: CPT | Mod: CPTII,S$GLB,, | Performed by: INTERNAL MEDICINE

## 2024-11-08 PROCEDURE — 3044F HG A1C LEVEL LT 7.0%: CPT | Mod: CPTII,S$GLB,, | Performed by: INTERNAL MEDICINE

## 2024-11-08 RX ORDER — ERYTHROMYCIN 5 MG/G
OINTMENT OPHTHALMIC EVERY 8 HOURS
Qty: 3.5 G | Refills: 0 | Status: SHIPPED | OUTPATIENT
Start: 2024-11-08

## 2024-11-08 NOTE — TELEPHONE ENCOUNTER
No care due was identified.  [facility] Embedded Care Due Messages. Reference number: [id number].   11/08/2024 10:21:44 AM CST

## 2024-11-08 NOTE — TELEPHONE ENCOUNTER
----- Message from Martha sent at 11/8/2024  9:12 AM CST -----  Contact: Patient [phone]  Requesting an RX refill or new RX.    Is this a refill or new RX:     RX name and strength FLUoxetine 20 MG capsule  Is this a 30 day or 90 day RX:     Pharmacy name and phone #   [id number] - [place]  [facility] [facility] & [address] [address]  Phone: [phone] Fax: [phone]      The doctors have asked that we provide their patients with the following 2 reminders -- prescription refills can take up to 72 hours, and a friendly reminder that in the future you can use your [facility] account to request refills: yes      Comment: Patient would like to increase to 25 mg

## 2024-11-08 NOTE — TELEPHONE ENCOUNTER
Lov 9/5/24, nov 12/5/24  Last filled 90  7/5/24     Msg mentioned wanted to increase rx to 25mg.  So I called her to inquire..  She is having increased anxiety daily w family  Matters . I explained next dose up is 40mg and  Best to discuss w/ dr mejia at visit.  She is fine with continue same dose for  Now till appt 12/5 to discuss further    Thanks matthew

## 2024-11-10 NOTE — PROGRESS NOTES
CC:  eye pain    HPI:  The patient is a 24-year-old female with mild intermittent asthma and anxiety who presents today with complaints of right eye discomfort.  She  woke up this morning with her right eye lid stuck together.  She reports having a crust.  There has been itchy for 1-2 days prior.  She does report some photosensitivity.  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    ROS: No fever chills.  Does complain of light sensitivity dizzy in the right eye.  Does get pain with looking at her cell phone.  She does report having headaches as well.    Physical exam:   General appearance: No acute distress  HEENT:  The patient is right upper eyelid was mildly swollen.  Conjunctiva was not injected.  She did have some photosensitivity to light.  No abnormalities were seen in the left eye.  Pulmonary: Good inspiratory, expiratory breath sounds were heard.  Her lungs are clear to auscultation.  No crackles or wheezing.  Cardiovascular: S1-S2, rhythm appear to be regular.    Assessment:    Conjunctivitis    Plan:    Will prescribe erythromycin ophthalmic ointment   2.  The patient is to contact us for symptoms do not improve or worsen.  She was made aware that we do have DrKarime Is on-call over the weekend.

## 2024-11-11 RX ORDER — FLUOXETINE HYDROCHLORIDE 20 MG/1
20 CAPSULE ORAL DAILY
Qty: 90 CAPSULE | Refills: 1 | Status: SHIPPED | OUTPATIENT
Start: 2024-11-11

## 2024-11-13 ENCOUNTER — CLINICAL SUPPORT (OUTPATIENT)
Dept: REHABILITATION | Facility: HOSPITAL | Age: 24
End: 2024-11-13
Payer: COMMERCIAL

## 2024-11-13 DIAGNOSIS — M54.50 CHRONIC BILATERAL LOW BACK PAIN WITHOUT SCIATICA: Primary | ICD-10-CM

## 2024-11-13 DIAGNOSIS — G89.29 CHRONIC BILATERAL LOW BACK PAIN WITHOUT SCIATICA: Primary | ICD-10-CM

## 2024-11-13 PROCEDURE — 97110 THERAPEUTIC EXERCISES: CPT

## 2024-11-13 PROCEDURE — 97112 NEUROMUSCULAR REEDUCATION: CPT

## 2024-11-13 NOTE — PROGRESS NOTES
"[facility]   Physical Therapy Treatment Note      Name: Dhara Cortes  Clinic Number: [id number]    Therapy Diagnosis:   Encounter Diagnosis   Name Primary?    Chronic bilateral low back pain without sciatica Yes     Physician: No Smith MD    Visit Date: 11/13/2024    Physician Orders: PT Eval and Treat  Medical Diagnosis from Referral: M54.50,G89.29 (ICD-10-CM) - Chronic bilateral low back pain without sciatica  Evaluation Date: 9/10/2024  Authorization Period Expiration: 12/31/2024  Plan of Care Expiration: 12/6/2024  Progress Note Due: 12/6/2024  Visit # / Visits authorized: 11/20 + EVAL   FOTO: 1/3     Precautions: Standard      Time In: 3:24 pm   Time Out: 4:17 pm   Total Billable Time: 53 minutes    Subjective     Patient reports: That her back is still hurting but she has a more positive outlook on it and even though it was hurting she still came into therapy.  She noted that she has been going to the gym far more often recently and the start of the increased pain seems to correlate with the start of program.      She was compliant with home exercise   Response to previous treatment: no exacerbation of symptoms  Functional change: no significant change at this time    Pain: 6/10  Location: bilateral low back    Objective      Objective Measures updated at progress report unless specified.     Treatment     Dhara received the treatments listed below:      therapeutic exercises to develop strength, endurance, ROM, and flexibility for 11 minutes including:    Patient Education   Side Lying Open Books 1 x 15 GTB Each Side     Not Today:   Thoracic extension over foam roll: 3 x 8 with 3" holds    manual therapy techniques were applied for 4 minutes, including:    Supine Thoracic HVLAT  Lumbar Gapping Grade V     Not Today:   Long Axis Hip Distraction, Grade V  Passive Hip Rotation Range of Motion   SI Joint "Shotgun" MET   SI Joint Anterior Rotation MET on Right   Sidelying " Lumbar Gapping, Grade IV-V  Manual Lumbar Distraction   Side lying R SI joint manipulation, grade V    neuromuscular re-education activities to improve: Balance, Coordination, Kinesthetic, Sense, Proprioception, and Posture for 42 minutes. The following activities were included:    TA Activation with Posterior Pelvic Tilt and Biofeedback 10 x 10 Sec   TA Activation with Posterior Pelvic Tilit, Biofeedback and Bent Knee Fall Out GTB 1 x 12   TA Activation with Posterior Pelvic Tilt, Biofeedback and Marches 1 x 12   Serratus Robots with Foam Roller 2 x 12   Paloff Press and Lift with 10# on Cable Column 2 x 10  Dead Lift Education and Dead Lifting with 2 8# 3 x 10     Not Today:   TA Activation Bridges on TheraBall 3 x 8 2 Sec Holds   Hand Heel Rock Backs with Low Back Arch 2 x 12   TA Activation with Posterior Pelvic Tilt on Wall 1 x 8   Sidelying Clams: GTB 2x12 3 Sec Holds  Pull Downs with Core Engagement 3 x 8   Anti-Flexion Lifts GTB 2 x 12  Anti-Extension Lifts GTB 2 x 12   Bent Over Hip Extension 2 x 15   Functional Movement Pattern Flexion 1 x 5  Supine Knee Elevated TheraBall Press Down 4 x 8 3 Sec Holds   Sanchez Carries 15# 3 Laps x 10 Yards Each   Overhead Theraball Presses into Wall 15 x 5 Sec (Cue for Core Contraction and Neutral Spine)    Quadruped Core Activation with Neutral Pelvis and Hip Extension 2 x 12   Lateral stepping: red theraband around ankles, 3 laps x 10 yds    Patient Education and Home Exercises       Education provided:   - Continue prior home exercise program    Written Home Exercises Provided: Pt instructed to continue prior HEP. Exercises were reviewed and Dhara was able to demonstrate them prior to the end of the session.  Dhara demonstrated good  understanding of the education provided. See Electronic Medical Record under Patient Instructions for exercises provided during therapy sessions    Assessment     Dhara presents today with continued high levels of pain and  irritability through the back.  This is continued with high levels of fear and catastrophizing behaviors that affect the patient's pain levels and symptom presentation.  She continues to receive and need to have pain science education to address additional psychosocial aspects of pain.  She continues to show constant tendency to fall into extreme lumbar extension into hyperlordosis.  She continued to work to improve coordination and control of a neutral spine while improving strengthening and offloading of the lumbar spine.  She will continue to work to address strengthening and motor control while improving strengthening and activation of the core to improve control to reduce shearing generating pain.  Her underlying hypermobility does lengthen the expected plan of care and is factored into the progress seen.  She will continue to work to improve lumbar/core stability in progressed position and continue to address glut and hip strengthening deficits and will be progressed as appropriate.      Dhara Is progressing well towards her goals.   Patient prognosis is Good.     Patient will continue to benefit from skilled outpatient physical therapy to address the deficits listed in the problem list box on initial evaluation, provide pt/family education and to maximize pt's level of independence in the home and community environment.     Patient's spiritual, cultural and educational needs considered and pt agreeable to plan of care and goals.     Anticipated barriers to physical therapy: chronicity of condition    Goals:   Short Term Goals (4 Weeks):  1. Patient will report decreased low back pain to </= 6/10 to increase tolerance for activities of daily living. Progressing, not met  2. Patient will increase hip extension range of motion to >/= 10 degrees in order to perform activities of daily living with less difficulty. Progressing, not met  3. Patient will improve impaired lower extremity manual muscle tests by 1/3  grade to improve strength for functional tasks. Progressing, not met  4. Patient will be compliant with home exercise program to supplement therapy in restoring functional mobility. Progressing, not met     Long Term Goals (8 Weeks):   1. Patient will report decreased low back pain to </= 3/10 to increase tolerance for activities of daily living. Progressing, not met  2. Patient will improve impaired lower extremity manual muscle tests to >/=4+/5 to improve strength for functional tasks. Progressing, not met  3. Patient will improve FOTO score to >/= 66% to demonstrate decrease in perceived limitations with functional mobility. Progressing, not met  4. Patient goal: to reduce her discomfort with static positions. Progressing, not met  5. Patient will perform 25# deadlift with good control to demonstrate improved core strength. Progressing, not met    Plan     Plan of care Certification: 9/10/2024 to 11/5/2024.     Outpatient Physical Therapy 2 times weekly for 8 weeks to include the following interventions: Electrical Stimulation , Gait Training, Manual Therapy, Moist Heat/ Ice, Neuromuscular Re-ed, Patient Education, Self Care, Therapeutic Activities, and Therapeutic Exercise.     Anirudh Vera, PT, DPT

## 2024-12-04 ENCOUNTER — LAB VISIT (OUTPATIENT)
Dept: LAB | Facility: HOSPITAL | Age: 24
End: 2024-12-04
Attending: INTERNAL MEDICINE
Payer: COMMERCIAL

## 2024-12-04 ENCOUNTER — OFFICE VISIT (OUTPATIENT)
Dept: INTERNAL MEDICINE | Facility: CLINIC | Age: 24
End: 2024-12-04
Payer: COMMERCIAL

## 2024-12-04 VITALS
DIASTOLIC BLOOD PRESSURE: 82 MMHG | RESPIRATION RATE: 18 BRPM | TEMPERATURE: 98 F | SYSTOLIC BLOOD PRESSURE: 120 MMHG | WEIGHT: 233.69 LBS | OXYGEN SATURATION: 99 % | HEIGHT: 60 IN | BODY MASS INDEX: 45.88 KG/M2 | HEART RATE: 61 BPM

## 2024-12-04 DIAGNOSIS — D50.9 IRON DEFICIENCY ANEMIA, UNSPECIFIED IRON DEFICIENCY ANEMIA TYPE: ICD-10-CM

## 2024-12-04 DIAGNOSIS — Z91.89 AT RISK FOR SEXUALLY TRANSMITTED INFECTION DUE TO UNPROTECTED SEX: ICD-10-CM

## 2024-12-04 DIAGNOSIS — E66.01 MORBID OBESITY WITH BMI OF 45.0-49.9, ADULT: ICD-10-CM

## 2024-12-04 DIAGNOSIS — M54.50 CHRONIC MIDLINE LOW BACK PAIN WITHOUT SCIATICA: ICD-10-CM

## 2024-12-04 DIAGNOSIS — F41.1 GENERALIZED ANXIETY DISORDER: Primary | ICD-10-CM

## 2024-12-04 DIAGNOSIS — Z20.2 POSSIBLE EXPOSURE TO STD: ICD-10-CM

## 2024-12-04 DIAGNOSIS — G89.29 CHRONIC MIDLINE LOW BACK PAIN WITHOUT SCIATICA: ICD-10-CM

## 2024-12-04 LAB
BASOPHILS # BLD AUTO: 0.03 K/UL (ref 0–0.2)
BASOPHILS NFR BLD: 0.4 % (ref 0–1.9)
DIFFERENTIAL METHOD BLD: ABNORMAL
EOSINOPHIL # BLD AUTO: 0.3 K/UL (ref 0–0.5)
EOSINOPHIL NFR BLD: 3.4 % (ref 0–8)
ERYTHROCYTE [DISTWIDTH] IN BLOOD BY AUTOMATED COUNT: 15.1 % (ref 11.5–14.5)
FERRITIN SERPL-MCNC: 19 NG/ML (ref 20–300)
HCG INTACT+B SERPL-ACNC: <2.4 MIU/ML
HCT VFR BLD AUTO: 36.9 % (ref 37–48.5)
HGB BLD-MCNC: 11 G/DL (ref 12–16)
HIV 1+2 AB+HIV1 P24 AG SERPL QL IA: NORMAL
IMM GRANULOCYTES # BLD AUTO: 0.02 K/UL (ref 0–0.04)
IMM GRANULOCYTES NFR BLD AUTO: 0.2 % (ref 0–0.5)
IRON SERPL-MCNC: 27 UG/DL (ref 30–160)
LYMPHOCYTES # BLD AUTO: 3.1 K/UL (ref 1–4.8)
LYMPHOCYTES NFR BLD: 37.5 % (ref 18–48)
MCH RBC QN AUTO: 22.8 PG (ref 27–31)
MCHC RBC AUTO-ENTMCNC: 29.8 G/DL (ref 32–36)
MCV RBC AUTO: 77 FL (ref 82–98)
MONOCYTES # BLD AUTO: 0.6 K/UL (ref 0.3–1)
MONOCYTES NFR BLD: 7.6 % (ref 4–15)
NEUTROPHILS # BLD AUTO: 4.1 K/UL (ref 1.8–7.7)
NEUTROPHILS NFR BLD: 50.9 % (ref 38–73)
NRBC BLD-RTO: 0 /100 WBC
PLATELET # BLD AUTO: 356 K/UL (ref 150–450)
PMV BLD AUTO: 12.1 FL (ref 9.2–12.9)
RBC # BLD AUTO: 4.82 M/UL (ref 4–5.4)
SATURATED IRON: 7 % (ref 20–50)
TOTAL IRON BINDING CAPACITY: 383 UG/DL (ref 250–450)
TRANSFERRIN SERPL-MCNC: 259 MG/DL (ref 200–375)
TREPONEMA PALLIDUM IGG+IGM AB [PRESENCE] IN SERUM OR PLASMA BY IMMUNOASSAY: NONREACTIVE
WBC # BLD AUTO: 8.15 K/UL (ref 3.9–12.7)

## 2024-12-04 PROCEDURE — 86695 HERPES SIMPLEX TYPE 1 TEST: CPT | Performed by: INTERNAL MEDICINE

## 2024-12-04 PROCEDURE — 3079F DIAST BP 80-89 MM HG: CPT | Mod: CPTII,S$GLB,, | Performed by: INTERNAL MEDICINE

## 2024-12-04 PROCEDURE — 99999 PR PBB SHADOW E&M-EST. PATIENT-LVL IV: CPT | Mod: PBBFAC,,, | Performed by: INTERNAL MEDICINE

## 2024-12-04 PROCEDURE — 1160F RVW MEDS BY RX/DR IN RCRD: CPT | Mod: CPTII,S$GLB,, | Performed by: INTERNAL MEDICINE

## 2024-12-04 PROCEDURE — 99214 OFFICE O/P EST MOD 30 MIN: CPT | Mod: S$GLB,,, | Performed by: INTERNAL MEDICINE

## 2024-12-04 PROCEDURE — 84702 CHORIONIC GONADOTROPIN TEST: CPT | Performed by: INTERNAL MEDICINE

## 2024-12-04 PROCEDURE — 3044F HG A1C LEVEL LT 7.0%: CPT | Mod: CPTII,S$GLB,, | Performed by: INTERNAL MEDICINE

## 2024-12-04 PROCEDURE — 36415 COLL VENOUS BLD VENIPUNCTURE: CPT | Mod: PO | Performed by: INTERNAL MEDICINE

## 2024-12-04 PROCEDURE — 85025 COMPLETE CBC W/AUTO DIFF WBC: CPT | Performed by: INTERNAL MEDICINE

## 2024-12-04 PROCEDURE — 83540 ASSAY OF IRON: CPT | Performed by: INTERNAL MEDICINE

## 2024-12-04 PROCEDURE — 3008F BODY MASS INDEX DOCD: CPT | Mod: CPTII,S$GLB,, | Performed by: INTERNAL MEDICINE

## 2024-12-04 PROCEDURE — 86593 SYPHILIS TEST NON-TREP QUANT: CPT | Performed by: INTERNAL MEDICINE

## 2024-12-04 PROCEDURE — 1159F MED LIST DOCD IN RCRD: CPT | Mod: CPTII,S$GLB,, | Performed by: INTERNAL MEDICINE

## 2024-12-04 PROCEDURE — 82728 ASSAY OF FERRITIN: CPT | Performed by: INTERNAL MEDICINE

## 2024-12-04 PROCEDURE — 87389 HIV-1 AG W/HIV-1&-2 AB AG IA: CPT | Performed by: INTERNAL MEDICINE

## 2024-12-04 PROCEDURE — 3074F SYST BP LT 130 MM HG: CPT | Mod: CPTII,S$GLB,, | Performed by: INTERNAL MEDICINE

## 2024-12-04 RX ORDER — FLUOXETINE 10 MG/1
10 CAPSULE ORAL DAILY
Qty: 90 CAPSULE | Refills: 0 | Status: SHIPPED | OUTPATIENT
Start: 2024-12-04

## 2024-12-04 NOTE — PROGRESS NOTES
Subjective:     Dhara Cortes is a 24 y.o. female who presents for   Chief Complaint   Patient presents with    Follow-up    Anxiety    Anemia    Low-back Pain    Obesity       HPI    Anxiety: She presents for follow up of anxiety disorder. Current symptoms: patient notes improvement but not sure if it is adequate. She denies current suicidal and homicidal ideation. She complains of the following side effects from the treatment: none.    Anemia: The patient presents for evaluation of anemia. She has had iron deficiency anemia for years. She denies blood in stool or urine.  The anemia is likely related to heavy menstrual cycle. Current treatments: Fe supplements Associated signs & symptoms: fatigue.    Obesity:  Body mass index is 45.64 kg/m².  Wt Readings from Last 3 Encounters:   12/04/24 106 kg (233 lb 11 oz)   11/08/24 106.7 kg (235 lb 3.7 oz)   09/05/24 109.1 kg (240 lb 8.4 oz)   - started tirzepatide from [employer], tolerates well but gets occasional headaches    Chronic low back pain--> patient would like to see Ortho since the pain continues. 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      Review of Systems   Constitutional:  Positive for appetite change (appetite has decreased) and fatigue. Negative for chills, diaphoresis and fever.   HENT:  Negative for congestion, ear pain, postnasal drip, sinus pressure and sore throat.    Eyes:  Negative for discharge and visual disturbance.   Respiratory:  Negative for cough and shortness of breath.    Cardiovascular:  Negative for chest pain and palpitations.   Gastrointestinal:  Negative for abdominal pain, constipation, diarrhea, nausea and vomiting.   Genitourinary:  Negative for dysuria, genital sores, urgency and vaginal pain.   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 Negative for arthralgias and myalgias.   Neurological:  Negative for dizziness, tremors, numbness and  headaches.   Psychiatric/Behavioral:  Negative for sleep disturbance. The patient is nervous/anxious (improved but may still have episodes of anxiety).           Objective:     Physical Exam  Vitals reviewed.   Constitutional:       General: She is awake. She is not in acute distress.     Appearance: Normal appearance. She is well-developed and well-groomed.   HENT:      Head: Normocephalic and atraumatic.      Right Ear: Hearing and external ear normal.      Left Ear: Hearing and external ear normal.      Nose: Nose normal. No congestion.      Mouth/Throat:      Mouth: Mucous membranes are moist.   Eyes:      General: Lids are normal. Vision grossly intact.   Cardiovascular:      Rate and Rhythm: Normal rate and regular rhythm.      Heart sounds: Normal heart sounds. No murmur heard.  Pulmonary:      Effort: Pulmonary effort is normal.      Breath sounds: Normal breath sounds. No decreased breath sounds or wheezing.   Abdominal:      General: Bowel sounds are normal. There is no distension.   Musculoskeletal:         General: Normal range of motion.      Cervical back: Normal range of motion.      Right lower leg: No edema.      Left lower leg: No edema.   Skin:     General: Skin is warm and dry.      Findings: No lesion or rash.   Neurological:      Mental Status: She is alert and oriented to person, place, and time.   Psychiatric:         Attention and Perception: Attention normal.         Mood and Affect: Mood normal.         Behavior: Behavior is cooperative.            Assessment:      1. Generalized anxiety disorder    2. Iron deficiency anemia, unspecified iron deficiency anemia type    3. Chronic midline low back pain without sciatica    4. Possible exposure to STD    5. At risk for sexually transmitted infection due to unprotected sex    6. Morbid obesity with BMI of 45.0-49.9, adult           Plan:     1. Generalized anxiety disorder  - will increase the dose of fluoxetine, patient was not comfortable  with a 40mg dose so will combine fluoxetine 10 mg + 20 mg of fluoxetine  -  FLUoxetine 10 MG capsule; Take 1 capsule (10 mg total) by mouth once daily.  Dispense: 90 capsule; Refill: 0    2. Iron deficiency anemia, unspecified iron deficiency anemia type  - continue iron, check labs  - CBC Auto Differential; Future  - Ferritin; Future  - Iron and TIBC; Future    3. Chronic midline low back pain without sciatica  - Ambulatory referral/consult to [facility]; Future    4. Possible exposure to STD  - HIV 1/2 Ag/Ab (4th Gen); Future  - HSV 1 & 2, IgG; Future  - C. trachomatis/N. gonorrhoeae by AMP DNA; Future  - Treponema Pallidium Antibodies IgG, IgM; Future    5. At risk for sexually transmitted infection due to unprotected sex  - HCG, QUANTITATIVE, PREGNANCY; Future    RTC in May 2025 for annual exam or sooner if needed    __________________________    No Smith MD, PharmD  [facility]  American Board of Obesity Medicine diplomate  Office [phone]         The patient is a 38y Female complaining of hyperglycemia.

## 2024-12-05 LAB
HSV1 IGG SERPL QL IA: NEGATIVE
HSV2 IGG SERPL QL IA: NEGATIVE

## 2024-12-07 DIAGNOSIS — D50.9 IRON DEFICIENCY ANEMIA, UNSPECIFIED IRON DEFICIENCY ANEMIA TYPE: ICD-10-CM

## 2024-12-07 DIAGNOSIS — Z00.00 ANNUAL PHYSICAL EXAM: Primary | ICD-10-CM

## 2024-12-20 ENCOUNTER — PATIENT MESSAGE (OUTPATIENT)
Dept: INTERNAL MEDICINE | Facility: CLINIC | Age: 24
End: 2024-12-20
Payer: COMMERCIAL

## 2025-01-07 NOTE — PROGRESS NOTES
OBSTETRICS AND GYNECOLOGY    Chief Complaint:  Well Woman Exam, Establish Care     HPI:      Dhara Cortes is a 25 y.o. P0 who presents today for well woman exam.    LMP: Patient's last menstrual period was 12/30/2024. Specifically, patient denies abnormal vaginal bleeding, abnormal discharge/odor, pelvic pain, or dysuria/hematuria. Ms. Cortes is currently sexually active with a single male partner. She is currently using condoms, coitus interruptus for contraception. Partner has had a vasectomy. She requests STD screening today. Wondering about how to perform self breast exams. She denies additional issues, problems, or complaints.     Gardasil: thinks has received  Ms. Cortes confirms that she wears her seatbelt when riding in the car.      GYNHx:  Menarche 13   Moderate flow. Lasting 5 days.  Dysmenorrhea: yes  History of STDs: no  History of abnormal pap smears: no  Sexually active: yes, male  # of lifetime partners: 2   Contraception in the past: OCPs   Breast/GYN/colon malignancy family history:  PGM uterine or cervical cancer, unsure, age 30s at diagnosis    ROS:     GENERAL: Feeling well overall.   BREASTS: Denies breast skin changes, lumps or nipple discharge.    URINARY: Denies dysuria, hematuria.    Physical Exam:      PHYSICAL EXAM:  /80   Ht 5' (1.524 m)   Wt 108 kg (238 lb 1.6 oz)   LMP 12/30/2024   BMI 46.50 kg/m²   Body mass index is 46.5 kg/m².     APPEARANCE:  Well nourished, well developed, in no acute distress.  Able to smile appropriately during our encounter. Makes eye contact. Pleasant.  PSYCH: Appropriate mood and affect.  SKIN:   No acne or hirsutism.  CARDIOVASCULAR:  No edema of peripheral extremities. Well perfused throughout.  RESP:  No accessory muscle use to breathe. Speaking comfortably in complete sentences.   BREASTS:  Symmetrical, with no visible skin lesions or scars, no palpable masses. No nipple discharge or peau d'orange bilaterally. No palpable axillary  LAD.  ABDOMEN:  Soft. Nonacute.    PELVIC:  Normal external genitalia without lesions. Normal hair distribution. Adequate perineal body, normal urethral meatus. Vagina moist and well rugated. Without lesions, without discharge. Cervix 3x4cm, nulliparous. Cervix pink, without ectocervical lesions, discharge or tenderness.  No ectropion. No significant cystocele or rectocele.  Bimanual exam shows uterus to be normal size, regular, mobile and nontender.  Adnexa without masses or tenderness.  Exam limited.    Female chaperone present. Pt tolerated exam well. Explained exam prior to start. Verbal permission granted.    Assessment/Plan:     Well Woman Exam  -- Counseled patient regarding healthy diet and regular exercise, daily seat belt use.   -- BP normotensive  -- She denies abuse and feels safe at home.   -- Pap smear:  obtained    -- STD screening:  ordered   -- Reviewed condoms for STD prevention  -- Labs ordered by PCP    Follow up in about 1 year (around 1/10/2026) for WWE.    Counseling:     Patient was counseled today on current ASCCP pap guidelines, the recommendation for yearly physical exams, safe driving habits, and breast self awareness. She is to see her PCP for other health maintenance.     Use of the [facility] Patient Portal discussed and encouraged during today's visit.           As of April 1, 2021, the Cures Act has been passed nationally. This new law requires that all doctors progress notes, lab results, pathology reports and radiology reports be released IMMEDIATELY to the patient in the patient portal. That means that the results are released to you at the EXACT same time they are released to me. Therefore, with all of the patients that I have I am not able to reply to each patient exactly when the results come in. So there will be a delay from when you see the results to when I see them and have time to come up with a response to send you. Also I only see these results when I am on the computer at  work. So if the results come in over the weekend or after 5 pm of a work day, I will not see them until the next business day. As you can tell, this is a challenge as a physician to give every patient the quick response they hope for and deserve. So please be patient!   Thanks for your understanding and patience.

## 2025-01-10 ENCOUNTER — OFFICE VISIT (OUTPATIENT)
Dept: OBSTETRICS AND GYNECOLOGY | Facility: CLINIC | Age: 25
End: 2025-01-10
Payer: COMMERCIAL

## 2025-01-10 ENCOUNTER — LAB VISIT (OUTPATIENT)
Dept: LAB | Facility: HOSPITAL | Age: 25
End: 2025-01-10
Attending: STUDENT IN AN ORGANIZED HEALTH CARE EDUCATION/TRAINING PROGRAM
Payer: COMMERCIAL

## 2025-01-10 VITALS
DIASTOLIC BLOOD PRESSURE: 80 MMHG | BODY MASS INDEX: 46.75 KG/M2 | SYSTOLIC BLOOD PRESSURE: 130 MMHG | WEIGHT: 238.13 LBS | HEIGHT: 60 IN

## 2025-01-10 DIAGNOSIS — Z01.419 ENCOUNTER FOR WELL WOMAN EXAM: Primary | ICD-10-CM

## 2025-01-10 DIAGNOSIS — Z11.3 SCREEN FOR STD (SEXUALLY TRANSMITTED DISEASE): ICD-10-CM

## 2025-01-10 DIAGNOSIS — Z00.00 ANNUAL PHYSICAL EXAM: ICD-10-CM

## 2025-01-10 DIAGNOSIS — Z12.4 SCREENING FOR CERVICAL CANCER: ICD-10-CM

## 2025-01-10 LAB
HBV SURFACE AG SERPL QL IA: NORMAL
HCV AB SERPL QL IA: NORMAL
HIV 1+2 AB+HIV1 P24 AG SERPL QL IA: NORMAL
TREPONEMA PALLIDUM IGG+IGM AB [PRESENCE] IN SERUM OR PLASMA BY IMMUNOASSAY: NONREACTIVE

## 2025-01-10 PROCEDURE — 86593 SYPHILIS TEST NON-TREP QUANT: CPT | Performed by: STUDENT IN AN ORGANIZED HEALTH CARE EDUCATION/TRAINING PROGRAM

## 2025-01-10 PROCEDURE — 99999 PR PBB SHADOW E&M-EST. PATIENT-LVL III: CPT | Mod: PBBFAC,,, | Performed by: STUDENT IN AN ORGANIZED HEALTH CARE EDUCATION/TRAINING PROGRAM

## 2025-01-10 PROCEDURE — 87491 CHLMYD TRACH DNA AMP PROBE: CPT | Performed by: STUDENT IN AN ORGANIZED HEALTH CARE EDUCATION/TRAINING PROGRAM

## 2025-01-10 PROCEDURE — 86803 HEPATITIS C AB TEST: CPT | Performed by: STUDENT IN AN ORGANIZED HEALTH CARE EDUCATION/TRAINING PROGRAM

## 2025-01-10 PROCEDURE — 87389 HIV-1 AG W/HIV-1&-2 AB AG IA: CPT | Performed by: STUDENT IN AN ORGANIZED HEALTH CARE EDUCATION/TRAINING PROGRAM

## 2025-01-10 PROCEDURE — 87340 HEPATITIS B SURFACE AG IA: CPT | Performed by: STUDENT IN AN ORGANIZED HEALTH CARE EDUCATION/TRAINING PROGRAM

## 2025-01-10 PROCEDURE — 36415 COLL VENOUS BLD VENIPUNCTURE: CPT | Performed by: STUDENT IN AN ORGANIZED HEALTH CARE EDUCATION/TRAINING PROGRAM

## 2025-01-11 LAB
C TRACH DNA SPEC QL NAA+PROBE: NOT DETECTED
N GONORRHOEA DNA SPEC QL NAA+PROBE: NOT DETECTED

## 2025-01-13 ENCOUNTER — PATIENT MESSAGE (OUTPATIENT)
Dept: INTERNAL MEDICINE | Facility: CLINIC | Age: 25
End: 2025-01-13
Payer: COMMERCIAL

## 2025-01-13 ENCOUNTER — PATIENT MESSAGE (OUTPATIENT)
Dept: OBSTETRICS AND GYNECOLOGY | Facility: CLINIC | Age: 25
End: 2025-01-13
Payer: COMMERCIAL

## 2025-01-13 DIAGNOSIS — F41.1 GENERALIZED ANXIETY DISORDER: ICD-10-CM

## 2025-01-13 RX ORDER — FLUOXETINE HYDROCHLORIDE 40 MG/1
40 CAPSULE ORAL DAILY
Qty: 30 CAPSULE | Refills: 0 | Status: SHIPPED | OUTPATIENT
Start: 2025-01-13

## 2025-01-13 NOTE — TELEPHONE ENCOUNTER
Sent 30d supply of fluoxetine 40mg daily.    Inform me if there is any improvement after 3 weeks and will refill.

## 2025-01-16 ENCOUNTER — PATIENT MESSAGE (OUTPATIENT)
Dept: REHABILITATION | Facility: HOSPITAL | Age: 25
End: 2025-01-16
Payer: COMMERCIAL

## 2025-01-28 ENCOUNTER — OFFICE VISIT (OUTPATIENT)
Dept: PAIN MEDICINE | Facility: CLINIC | Age: 25
End: 2025-01-28
Payer: COMMERCIAL

## 2025-01-28 ENCOUNTER — HOSPITAL ENCOUNTER (OUTPATIENT)
Dept: RADIOLOGY | Facility: HOSPITAL | Age: 25
Discharge: HOME | End: 2025-01-28
Attending: STUDENT IN AN ORGANIZED HEALTH CARE EDUCATION/TRAINING PROGRAM
Payer: COMMERCIAL

## 2025-01-28 VITALS
BODY MASS INDEX: 46.22 KG/M2 | HEART RATE: 71 BPM | HEIGHT: 60 IN | DIASTOLIC BLOOD PRESSURE: 72 MMHG | SYSTOLIC BLOOD PRESSURE: 112 MMHG | WEIGHT: 235.44 LBS

## 2025-01-28 DIAGNOSIS — G89.29 CHRONIC MIDLINE LOW BACK PAIN WITHOUT SCIATICA: ICD-10-CM

## 2025-01-28 DIAGNOSIS — M79.18 MYOFASCIAL PAIN SYNDROME: ICD-10-CM

## 2025-01-28 DIAGNOSIS — M54.50 CHRONIC MIDLINE LOW BACK PAIN WITHOUT SCIATICA: ICD-10-CM

## 2025-01-28 DIAGNOSIS — G89.4 CHRONIC PAIN SYNDROME: ICD-10-CM

## 2025-01-28 DIAGNOSIS — M54.51 VERTEBROGENIC LOW BACK PAIN: ICD-10-CM

## 2025-01-28 DIAGNOSIS — M54.51 VERTEBROGENIC LOW BACK PAIN: Primary | ICD-10-CM

## 2025-01-28 PROCEDURE — 1160F RVW MEDS BY RX/DR IN RCRD: CPT | Mod: CPTII,S$GLB,, | Performed by: STUDENT IN AN ORGANIZED HEALTH CARE EDUCATION/TRAINING PROGRAM

## 2025-01-28 PROCEDURE — 99999 PR PBB SHADOW E&M-EST. PATIENT-LVL IV: CPT | Mod: PBBFAC,,, | Performed by: STUDENT IN AN ORGANIZED HEALTH CARE EDUCATION/TRAINING PROGRAM

## 2025-01-28 PROCEDURE — 72070 X-RAY EXAM THORAC SPINE 2VWS: CPT | Mod: 26,,, | Performed by: RADIOLOGY

## 2025-01-28 PROCEDURE — 72114 X-RAY EXAM L-S SPINE BENDING: CPT | Mod: TC

## 2025-01-28 PROCEDURE — G2211 COMPLEX E/M VISIT ADD ON: HCPCS | Mod: S$GLB,,, | Performed by: STUDENT IN AN ORGANIZED HEALTH CARE EDUCATION/TRAINING PROGRAM

## 2025-01-28 PROCEDURE — 1159F MED LIST DOCD IN RCRD: CPT | Mod: CPTII,S$GLB,, | Performed by: STUDENT IN AN ORGANIZED HEALTH CARE EDUCATION/TRAINING PROGRAM

## 2025-01-28 PROCEDURE — 3078F DIAST BP <80 MM HG: CPT | Mod: CPTII,S$GLB,, | Performed by: STUDENT IN AN ORGANIZED HEALTH CARE EDUCATION/TRAINING PROGRAM

## 2025-01-28 PROCEDURE — 99204 OFFICE O/P NEW MOD 45 MIN: CPT | Mod: S$GLB,,, | Performed by: STUDENT IN AN ORGANIZED HEALTH CARE EDUCATION/TRAINING PROGRAM

## 2025-01-28 PROCEDURE — 72070 X-RAY EXAM THORAC SPINE 2VWS: CPT | Mod: TC

## 2025-01-28 PROCEDURE — 3008F BODY MASS INDEX DOCD: CPT | Mod: CPTII,S$GLB,, | Performed by: STUDENT IN AN ORGANIZED HEALTH CARE EDUCATION/TRAINING PROGRAM

## 2025-01-28 PROCEDURE — 3074F SYST BP LT 130 MM HG: CPT | Mod: CPTII,S$GLB,, | Performed by: STUDENT IN AN ORGANIZED HEALTH CARE EDUCATION/TRAINING PROGRAM

## 2025-01-28 PROCEDURE — 72114 X-RAY EXAM L-S SPINE BENDING: CPT | Mod: 26,,, | Performed by: RADIOLOGY

## 2025-01-28 RX ORDER — METHOCARBAMOL 500 MG/1
500 TABLET, FILM COATED ORAL 3 TIMES DAILY PRN
Qty: 90 TABLET | Refills: 2 | Status: SHIPPED | OUTPATIENT
Start: 2025-01-28

## 2025-01-28 NOTE — PROGRESS NOTES
Chronic Pain - New Consult    Referring Physician: No Smith MD    Chief Complaint:   Chief Complaint   Patient presents with    Back Pain          SUBJECTIVE:    Dhara Cortes presents to the clinic for the evaluation of lower back pain. The pain started 2 years ago following no inciting event and symptoms have been worsening.The pain is located in the lower back area and does not radiate.  The pain is described as aching and is rated as 6/10. The pain is rated with a score of  3/10 on the BEST day and a score of 9/10 on the WORST day.  Symptoms interfere with daily activity and sleeping. The pain is exacerbated by Sitting, Getting out of bed/chair, and long distance drives.  The pain is mitigated by heat and medications. The patient reports 5 hours of uninterrupted sleep per night (due to anxiety medication).    Patient denies urinary incontinence, bowel incontinence, and significant motor weakness. She reports she had tonsils removed about a year ago, but she feels swollen/stiffness in her neck.  She reports this is just next to her jaw line    Physical Therapy/Home Exercise: yes, finished PT in November and continuing home exercises with some improvement      Pain Disability Index Review:      1/28/2025     8:13 AM   Last 3 PDI Scores   Pain Disability Index (PDI) 42       Pain Medications:   - ibuprofen (helps)     report:  Reviewed and consistent with medication use as prescribed.      Pain Procedures:   None    Imaging: None available    Past Medical History:   Diagnosis Date    Asthma     House dust mite allergy 7/6/2023    Mild intermittent asthma without complication 7/6/2023     Past Surgical History:   Procedure Laterality Date    TONSILLECTOMY  02/16/2024     Social History     Socioeconomic History    Marital status: Single   Tobacco Use    Smoking status: Never    Smokeless tobacco: Never   Substance and Sexual Activity    Alcohol use: Not Currently    Drug use: Never    Sexual activity:  Yes     Birth control/protection: Patch     Social Drivers of Health     Financial Resource Strain: Medium Risk (5/8/2024)    Overall Financial Resource Strain (CARDIA)     Difficulty of Paying Living Expenses: Somewhat hard   Food Insecurity: No Food Insecurity (5/8/2024)    Hunger Vital Sign     Worried About Running Out of Food in the Last Year: Never true     Ran Out of Food in the Last Year: Never true   Transportation Needs: No Transportation Needs (5/8/2024)    PRAPARE - Transportation     Lack of Transportation (Medical): No     Lack of Transportation (Non-Medical): No   Physical Activity: Sufficiently Active (5/8/2024)    Exercise Vital Sign     Days of Exercise per Week: 3 days     Minutes of Exercise per Session: 60 min   Stress: Stress Concern Present (5/8/2024)    [employer] - Occupational Stress Questionnaire     Feeling of Stress : Very much   Housing Stability: Unknown (5/8/2024)    Housing Stability Vital Sign     Unable to Pay for Housing in the Last Year: No     Family History   Problem Relation Name Age of Onset    Thyroid cancer Mother          follicular thyroid cancer    No Known Problems Father         Review of patient's allergies indicates:  No Known Allergies    Current Outpatient Medications   Medication Sig    albuterol (PROVENTIL) 5 mg/mL nebulizer solution Inhale 2.5 mg into the lungs every 6 (six) hours as needed.    ergocalciferol, vitamin D2, (VITAMIN D ORAL) Take by mouth.    FLUoxetine 40 MG capsule Take 1 capsule (40 mg total) by mouth once daily.    iron,carbon,gluc-FA-B12-C-dss (FERRALET 90 DUAL-IRON DELIVERY) 90-1-12-50 mg-mg-mcg-mg Tab Take 1 tablet by mouth once daily.    methocarbamoL (ROBAXIN) 500 MG Tab Take 1 tablet (500 mg total) by mouth 3 (three) times daily as needed (muscular pain).     No current facility-administered medications for this visit.       REVIEW OF SYSTEMS:    GENERAL:  current fevers or chills, recent use of antibiotics  denies.  HEENT:  History of migraines/headaches: denies, History of major throat surgery: denies  RESPIRATORY:  History of home oxygen or pulmonary hypertension/severe breathing dysfunction: denies; Smoking history: denies  CARDIOVASCULAR:  Hx of palpitations/rhythm problems: denies Hx of Heart Attacks/Surgery: denies  GI:  Recent abdominal discomfort or recent change in bowel habits denies  MUSCULOSKELETAL:  See HPI.  SKIN:  unhealed wounds or rashes: denies  PSYCH: major psychiatric history or recent psychosocial stressors reports anxiety  HEMATOLOGY/LYMPHOLOGY:  Hx of prolonged bleeding, Hx of Blood thinner usage: denies  NEURO:   history of seizures, strokes, chronic/old weakness (such as paralysis or paresis of any body part): denies  All other reviewed and negative other than HPI.    OBJECTIVE:    /72 (BP Location: Right arm, Patient Position: Sitting)   Pulse 71   Ht 5' (1.524 m)   Wt 106.8 kg (235 lb 7.2 oz)   LMP 12/30/2024   BMI 45.98 kg/m²     PHYSICAL EXAMINATION:  General appearance: Well appearing, in no acute distress, alert and appropriately communicative.  Psych:  Mood and affect appropriate.  Skin: Skin color, texture, turgor normal, no rashes or lesions, in both upper and lower body for exposed skin.  Head/face:  Atraumatic, normocephalic.  Neck: No pain to palpation over the cervical paraspinous muscles. Spurling Negative. No pain with neck flexion, extension, or lateral flexion. .  Cor: regular rate  Pulm: non-labored breathing  GI: Abdomen non-distended and non-tender.  Back: Straight leg raising in the sitting and supine positions is negative to radicular pain. No pain to palpation over the spine or paraspinal muscles. Pain with flexion, +Millgrams  Extremities: No deformities, significant lymphedema, or skin discoloration. No significant open wounds. No major amputations.  Musculoskeletal: hip, sacroiliac and knee provocative maneuvers are negative. Bilateral upper and lower  extremity strength is normal and symmetric.  No atrophy or tone abnormalities are noted.  Neuro: Bilateral upper and lower extremity coordination and muscle stretch reflexes abnormalities noted: none.  Acosta and/or Clonus: negative; loss of sensation to light touch: none  Gait: normal    CMP  Sodium   Date Value Ref Range Status   05/08/2024 139 136 - 145 mmol/L Final     Potassium   Date Value Ref Range Status   05/08/2024 3.8 3.5 - 5.1 mmol/L Final     Chloride   Date Value Ref Range Status   05/08/2024 109 95 - 110 mmol/L Final     CO2   Date Value Ref Range Status   05/08/2024 21 (L) 23 - 29 mmol/L Final     Glucose   Date Value Ref Range Status   05/08/2024 76 70 - 110 mg/dL Final     BUN   Date Value Ref Range Status   05/08/2024 9 6 - 20 mg/dL Final     Creatinine   Date Value Ref Range Status   05/08/2024 0.7 0.5 - 1.4 mg/dL Final     Calcium   Date Value Ref Range Status   05/08/2024 9.5 8.7 - 10.5 mg/dL Final     Total Protein   Date Value Ref Range Status   05/08/2024 7.6 6.0 - 8.4 g/dL Final     Albumin   Date Value Ref Range Status   05/08/2024 3.5 3.5 - 5.2 g/dL Final     Total Bilirubin   Date Value Ref Range Status   05/08/2024 0.5 0.1 - 1.0 mg/dL Final     Comment:     For infants and newborns, interpretation of results should be based  on gestational age, weight and in agreement with clinical  observations.    Premature Infant recommended reference ranges:  Up to 24 hours.............<8.0 mg/dL  Up to 48 hours............<12.0 mg/dL  3-5 days..................<15.0 mg/dL  6-29 days.................<15.0 mg/dL       Alkaline Phosphatase   Date Value Ref Range Status   05/08/2024 63 55 - 135 U/L Final     AST   Date Value Ref Range Status   05/08/2024 20 10 - 40 U/L Final     ALT   Date Value Ref Range Status   05/08/2024 13 10 - 44 U/L Final     Anion Gap   Date Value Ref Range Status   05/08/2024 9 8 - 16 mmol/L Final     eGFR   Date Value Ref Range Status   05/08/2024 >60.0 >60 mL/min/1.73 m^2  Final         ASSESSMENT: 25 y.o. year old female with chronic pain, consistent with:    1. Vertebrogenic low back pain  X-Ray Lumbar Complete Including Flex And Ext      2. Chronic midline low back pain without sciatica  Ambulatory referral/consult to [facility]    X-Ray Lumbar Complete Including Flex And Ext    X-Ray Thoracic Spine AP Lateral      3. Chronic pain syndrome  Ambulatory referral/consult to [facility]    methocarbamoL (ROBAXIN) 500 MG Tab      4. Myofascial pain syndrome  methocarbamoL (ROBAXIN) 500 MG Tab        IMPRESSION: Dhara Cortes presents today for lower back pain.  She also complains of mid back pain.  History and physical exam are consistent with myofascial pain syndrome and vertebrogenic low back pain.  There is no imaging at this time.  We will start with basic imaging and conservative management (physical therapy and non-narcotic medications). If their pain persists despite conservative measures, we will consider advanced imaging and/or interventions in an effort to give them additional relief for their pain.    PLAN:   - I have stressed the importance of physical activity and a home exercise plan to help with pain and improve health.  - Patient can continue with medications for now since they are providing benefits, using them appropriately, and without side effects.  - xray lumbar spine with flexion/extension  - we can consider an MRI lumbar spine if the xray lumbar spine appears abnormal  - xray thoracic spine  - referral to functional restoration program  - continue home exercise program  - start robaxin 500mg ever 8 hours for muscular pain  - RTC in 2 - 3 months   - Counseled patient regarding the importance of activity modification and physical therapy.    The above plan and management options were discussed at length with patient. Patient is in agreement with the above and verbalized understanding. It will be communicated with the referring physician  via electronic record, fax, or mail.    Remedios Cline  01/28/2025

## 2025-01-29 ENCOUNTER — TELEPHONE (OUTPATIENT)
Dept: ADMINISTRATIVE | Facility: OTHER | Age: 25
End: 2025-01-29
Payer: COMMERCIAL

## 2025-01-29 NOTE — TELEPHONE ENCOUNTER
Left voice message for patient to return call to schedule appointment from referral to Functional Restoration department. Contact number provided, and My Chart message sent.  Georgie COOPER [phone]

## 2025-01-31 ENCOUNTER — TELEPHONE (OUTPATIENT)
Dept: ADMINISTRATIVE | Facility: OTHER | Age: 25
End: 2025-01-31
Payer: COMMERCIAL

## 2025-01-31 NOTE — TELEPHONE ENCOUNTER
Left voice message for patient to return call to schedule appointment from referral to Functional Restoration department. Contact number provided, and My Chart message to be sent.  Georgie COOPER [phone]

## 2025-03-02 DIAGNOSIS — F41.1 GENERALIZED ANXIETY DISORDER: ICD-10-CM

## 2025-03-03 NOTE — TELEPHONE ENCOUNTER
No care due was identified.  [facility] Embedded Care Due Messages. Reference number: [id number].   3/02/2025 7:16:32 PM CST

## 2025-03-03 NOTE — TELEPHONE ENCOUNTER
Refill Routing Note   Medication(s) are not appropriate for processing by [facility] for the following reason(s):        New or recently adjusted medication    [facility] action(s):  Defer               Appointments  past 12m or future 3m with PCP    Date Provider   Last Visit   12/4/2024 No Smith MD   Next Visit   5/12/2025 No Smith MD   ED visits in past 90 days: 0        Note composed:9:41 PM 03/02/2025

## 2025-03-05 RX ORDER — FLUOXETINE HYDROCHLORIDE 40 MG/1
CAPSULE ORAL
Qty: 30 CAPSULE | Refills: 2 | Status: SHIPPED | OUTPATIENT
Start: 2025-03-05

## 2025-05-04 ENCOUNTER — PATIENT MESSAGE (OUTPATIENT)
Dept: INTERNAL MEDICINE | Facility: CLINIC | Age: 25
End: 2025-05-04
Payer: COMMERCIAL

## 2025-05-14 NOTE — PROGRESS NOTES
"Primary Care Telemedicine Note    The patient location is:  Home     The chief complaint leading to consultation is: Anxiety, Back Pain, Macromastia    Total time spent with patient: 30 min    Visit type: Virtual visit with synchronous audio only and video      Each patient to whom he or she provides medical services by telemedicine is:  (1) informed of the relationship between the physician and patient and the respective role of any other health care provider with respect to management of the patient; and (2) notified that he or she may decline to receive medical services by telemedicine and may withdraw from such care at any time.        Subjective:        Patient ID: Dhara Cortes is a 25 y.o. female.    Chief Complaint: Follow-up, Anxiety, Back Pain, and Obesity      HPI    Anxiety: She presents for follow up of anxiety disorder. Current symptoms: none. She denies current suicidal and homicidal ideation. She complains of the following side effects from the treatment: none.    Back Pain: The patient has had recurrent self limited episodes of low back pain in the past. Symptoms have been present for several months and are unchanged.  Onset was related to / precipitated by no known injury and she suspect that heavy breasts are the cause. . She has tried muscle relaxants and tried PT. She has no other symptoms associated with the back pain. The patient has no "red flag" history indicative of complicated back pain..    Obesity:  Wt Readings from Last 3 Encounters:   06/18/25 107.8 kg (237 lb 10.5 oz)   01/28/25 106.8 kg (235 lb 7.2 oz)   01/10/25 108 kg (238 lb 1.6 oz)   - Weight 235 lbs  -  she lost 10 lbs, has been working out regularly  - exercises 3x weekly with , strength & cardio 45 min to 1 hour        Review of Systems   Constitutional:  Negative for chills, diaphoresis and fever.   HENT:  Negative for congestion, ear pain and sinus pressure.    Eyes:  Negative for discharge and redness. "   Respiratory:  Negative for cough and shortness of breath.    Cardiovascular:  Negative for chest pain, palpitations and leg swelling.   Gastrointestinal:  Negative for abdominal pain, constipation, diarrhea, nausea and vomiting.   Musculoskeletal:  Positive for back pain (tried muscle relaxants, pain 7/10 in severity, ibuprofen helps). Negative for arthralgias and myalgias.        She is concerned that the size of her breasts causes the back pain. Wears XXL sports bra. She also reports rashes under her breasts.   Skin:  Negative for rash and wound.   Neurological:  Negative for dizziness, tremors and headaches.   Psychiatric/Behavioral:  Positive for sleep disturbance (back pain interrupts her sleep). The patient is nervous/anxious (sees her therapist every week).            Objective:        Physical Exam  Constitutional:       General: She is not in acute distress.     Appearance: Normal appearance. She is well-developed. She is not toxic-appearing or diaphoretic.   HENT:      Head: Normocephalic and atraumatic.      Right Ear: Hearing normal.      Left Ear: Hearing normal.      Nose: No congestion.   Eyes:      General: Lids are normal.   Pulmonary:      Effort: No tachypnea or respiratory distress.   Musculoskeletal:      Cervical back: Normal range of motion.   Neurological:      Mental Status: She is alert and oriented to person, place, and time.      Comments: Alert and oriented   Psychiatric:         Attention and Perception: Attention normal.         Mood and Affect: Mood normal.         Behavior: Behavior is cooperative.               Assessment:       1. Generalized anxiety disorder    2. Macromastia    3. Musculoskeletal back pain              Plan:     1. Generalized anxiety disorder  - improved, continue fluoxetine    2. Macromastia  - Ambulatory referral/consult to Plastic Surgery; Future  - visit [employer] website to review info about various plastic surgeons in area    3. Musculoskeletal back  pain  - diclofenac (VOLTAREN) 50 MG EC tablet; Take 1 tablet (50 mg total) by mouth 2 (two) times daily as needed (back pain).  Dispense: 30 tablet; Refill: 0    RTC for annual exam in 1 month or sooner if needed  __________________________    No Smith MD, PharmD  [facility]  [employer] Board of Obesity Medicine diplomate  Office [phone]

## 2025-05-15 ENCOUNTER — OFFICE VISIT (OUTPATIENT)
Dept: INTERNAL MEDICINE | Facility: CLINIC | Age: 25
End: 2025-05-15
Payer: COMMERCIAL

## 2025-05-15 DIAGNOSIS — N62 MACROMASTIA: ICD-10-CM

## 2025-05-15 DIAGNOSIS — F41.1 GENERALIZED ANXIETY DISORDER: Primary | ICD-10-CM

## 2025-05-15 DIAGNOSIS — M54.9 MUSCULOSKELETAL BACK PAIN: ICD-10-CM

## 2025-05-15 RX ORDER — DICLOFENAC SODIUM 50 MG/1
50 TABLET, DELAYED RELEASE ORAL 2 TIMES DAILY PRN
Qty: 30 TABLET | Refills: 0 | Status: SHIPPED | OUTPATIENT
Start: 2025-05-15

## 2025-06-18 ENCOUNTER — OFFICE VISIT (OUTPATIENT)
Dept: INTERNAL MEDICINE | Facility: CLINIC | Age: 25
End: 2025-06-18
Payer: COMMERCIAL

## 2025-06-18 ENCOUNTER — PATIENT MESSAGE (OUTPATIENT)
Dept: PLASTIC SURGERY | Facility: CLINIC | Age: 25
End: 2025-06-18
Payer: COMMERCIAL

## 2025-06-18 VITALS
OXYGEN SATURATION: 99 % | TEMPERATURE: 98 F | HEART RATE: 86 BPM | DIASTOLIC BLOOD PRESSURE: 88 MMHG | WEIGHT: 237.63 LBS | RESPIRATION RATE: 18 BRPM | HEIGHT: 60 IN | BODY MASS INDEX: 46.65 KG/M2 | SYSTOLIC BLOOD PRESSURE: 120 MMHG

## 2025-06-18 DIAGNOSIS — Z00.00 ANNUAL PHYSICAL EXAM: Primary | ICD-10-CM

## 2025-06-18 DIAGNOSIS — F41.1 GENERALIZED ANXIETY DISORDER: ICD-10-CM

## 2025-06-18 DIAGNOSIS — G47.63 SLEEP RELATED BRUXISM: ICD-10-CM

## 2025-06-18 DIAGNOSIS — D50.9 IRON DEFICIENCY ANEMIA, UNSPECIFIED IRON DEFICIENCY ANEMIA TYPE: ICD-10-CM

## 2025-06-18 DIAGNOSIS — J45.20 MILD INTERMITTENT ASTHMA WITHOUT COMPLICATION: ICD-10-CM

## 2025-06-18 DIAGNOSIS — E66.01 MORBID OBESITY WITH BMI OF 45.0-49.9, ADULT: ICD-10-CM

## 2025-06-18 DIAGNOSIS — N62 MACROMASTIA: ICD-10-CM

## 2025-06-18 DIAGNOSIS — M54.9 MUSCULOSKELETAL BACK PAIN: ICD-10-CM

## 2025-06-18 PROCEDURE — 99999 PR PBB SHADOW E&M-EST. PATIENT-LVL IV: CPT | Mod: PBBFAC,,, | Performed by: INTERNAL MEDICINE
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
## 2025-06-23 PROBLEM — N62 MACROMASTIA: Status: ACTIVE | Noted: 2025-06-23

## 2025-06-23 PROBLEM — F45.8 GRINDING OF TEETH: Status: ACTIVE | Noted: 2025-06-23

## 2025-07-09 ENCOUNTER — OFFICE VISIT (OUTPATIENT)
Dept: PLASTIC SURGERY | Facility: CLINIC | Age: 25
End: 2025-07-09
Payer: COMMERCIAL

## 2025-07-09 VITALS
DIASTOLIC BLOOD PRESSURE: 73 MMHG | SYSTOLIC BLOOD PRESSURE: 128 MMHG | BODY MASS INDEX: 46.65 KG/M2 | HEIGHT: 60 IN | WEIGHT: 237.63 LBS

## 2025-07-09 DIAGNOSIS — L30.4 ERYTHEMA INTERTRIGO: ICD-10-CM

## 2025-07-09 DIAGNOSIS — G89.29 CHRONIC PAIN IN RIGHT SHOULDER: ICD-10-CM

## 2025-07-09 DIAGNOSIS — M54.9 UPPER BACK PAIN: ICD-10-CM

## 2025-07-09 DIAGNOSIS — M25.511 CHRONIC PAIN IN RIGHT SHOULDER: ICD-10-CM

## 2025-07-09 DIAGNOSIS — G89.29 CHRONIC PAIN IN LEFT SHOULDER: ICD-10-CM

## 2025-07-09 DIAGNOSIS — N62 MACROMASTIA: ICD-10-CM

## 2025-07-09 DIAGNOSIS — M25.512 CHRONIC PAIN IN LEFT SHOULDER: ICD-10-CM

## 2025-07-09 DIAGNOSIS — N62 HYPERTROPHY OF BREAST: Primary | ICD-10-CM

## 2025-07-09 DIAGNOSIS — M54.2 CERVICALGIA: ICD-10-CM

## 2025-07-09 PROCEDURE — 1159F MED LIST DOCD IN RCRD: CPT | Mod: CPTII,S$GLB,, | Performed by: SURGERY

## 2025-07-09 PROCEDURE — 99204 OFFICE O/P NEW MOD 45 MIN: CPT | Mod: S$GLB,,, | Performed by: SURGERY

## 2025-07-09 PROCEDURE — 3074F SYST BP LT 130 MM HG: CPT | Mod: CPTII,S$GLB,, | Performed by: SURGERY

## 2025-07-09 PROCEDURE — 99999 PR PBB SHADOW E&M-EST. PATIENT-LVL III: CPT | Mod: PBBFAC,,, | Performed by: SURGERY

## 2025-07-09 PROCEDURE — 3078F DIAST BP <80 MM HG: CPT | Mod: CPTII,S$GLB,, | Performed by: SURGERY

## 2025-07-09 PROCEDURE — 3008F BODY MASS INDEX DOCD: CPT | Mod: CPTII,S$GLB,, | Performed by: SURGERY

## 2025-07-09 NOTE — PROGRESS NOTES
"  History & Physical    SUBJECTIVE:   Chief complaint: large breasts    History of Present Illness:  Dhara Cortes presents to Plastic Surgery clinic on 7/9/2025 for evaluation for bilateral breast reduction secondary to symptomatic bilateral large pendulous breast. She has a chief complaint of chronic neck and back pain for over 2 years. She has tried ibuprofen, motrin, aleve, tylenol, narcotics, and muscle relaxants without alleviation of pain. Patient reports that she has also completed physical therapy without relief of symptoms. She also complains of deep shoulder grooving from her bra straps as well as macerating rashes below each breast that have not significantly improved with application of medicated ointments and creams. Patient states that she has spoken to her PCP who recommended Aquafor without any relief. She currently reports a bra size of DDD. 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 She denies smoking tobacco or the use of any nicotine containing products.     PMH: anxiety (monitored by PCP)    Review of Systems:   HENT: Positive for neck pain.    Musculoskeletal: Positive for back pain.   Neurological: Negative for headaches or dizziness    OBJECTIVE:     /73   Ht 5' (1.524 m)   Wt 107.8 kg (237 lb 10.5 oz)   LMP 06/12/2025 (Exact Date)   BMI 46.41 kg/m²   Physical Exam:  Height: 5'0"  Weight: 233 lbs  WD WN NAD  VSS  Normal resp effort  Chest: Effort normal and breath sounds normal. Bilaterally enlarged breasts, evidence of previous rashes, shoulder grooving, no palpable masses, nipple everted    Last MMG N/A    ASSESSMENT/PLAN:     1.Symptomatic Bilateral Macromastia  2. Chronic neck pain  3. Chronic back pain  4. Chronic breast rashes    PLAN:Plan    -Pt was seen and evaluated by myself and Dr. Hakan Maxwell.   -Plan for bilateral breast reduction and will need approximately 800 gm reduction per side with the intention of symptomatic relief.   -Pt would like to be quoted for " aggressive liposuction axilla. Staff to quote  -Clearances needed: PCP  -Photos to be obtained  -Risk, benefits, and alternatives explained. She understands that the risks include but are not limited to bleeding, scarring, infection, pain, numbness, asymmetry, deformity, open wound, skin necrosis, wound dehiscence, permanent or temporary loss of sensation to the nipple, partial or total nipple loss requiring removal, nipple malposition, poor cosmetic outcome, hematoma, seroma and pulmonary emobolus. Discussed with patient that we will not remove any axillary breast tissue or any tissue towards the back as we will not flip the patient during surgery. The patient fully understands that she may have axillary fullness and would like to proceed.   - Patient understands that this may not relieve her of all of her symptoms; however, she wishes to proceed.   - Patient would like to proceed with scheduling bilateral breast reduction pending insurance authorization    All questions were answered. The patient was advised to call the clinic with any questions or concerns prior to their next visit.     Bria Rodriguez   Plastic and Reconstructive Surgery   [phone]       Procedure: BBR + aggressive liposuction axilla (quoted)  CPT codes: 65829 Breast reduction  Orders/Clearance: PAT Anesthesia Triage  Photos: Breast

## 2025-08-22 DIAGNOSIS — F41.1 GENERALIZED ANXIETY DISORDER: ICD-10-CM

## 2025-08-22 RX ORDER — FLUOXETINE HYDROCHLORIDE 40 MG/1
CAPSULE ORAL
Qty: 90 CAPSULE | Refills: 3 | Status: SHIPPED | OUTPATIENT
Start: 2025-08-22